# Patient Record
Sex: FEMALE | Race: BLACK OR AFRICAN AMERICAN | Employment: PART TIME | URBAN - METROPOLITAN AREA
[De-identification: names, ages, dates, MRNs, and addresses within clinical notes are randomized per-mention and may not be internally consistent; named-entity substitution may affect disease eponyms.]

---

## 2022-06-21 ENCOUNTER — APPOINTMENT (OUTPATIENT)
Dept: ULTRASOUND IMAGING | Age: 22
DRG: 872 | End: 2022-06-21

## 2022-06-21 ENCOUNTER — HOSPITAL ENCOUNTER (INPATIENT)
Age: 22
LOS: 3 days | Discharge: HOME | DRG: 872 | End: 2022-06-24
Attending: EMERGENCY MEDICINE

## 2022-06-21 DIAGNOSIS — N72 CERVICITIS AND ENDOCERVICITIS: ICD-10-CM

## 2022-06-21 DIAGNOSIS — N30.00 ACUTE CYSTITIS WITHOUT HEMATURIA: ICD-10-CM

## 2022-06-21 DIAGNOSIS — N73.0 PID (ACUTE PELVIC INFLAMMATORY DISEASE): ICD-10-CM

## 2022-06-21 DIAGNOSIS — N73.9 PID (PELVIC INFLAMMATORY DISEASE): ICD-10-CM

## 2022-06-21 DIAGNOSIS — A59.9 TRICHOMONIASIS: Primary | ICD-10-CM

## 2022-06-21 DIAGNOSIS — N70.91 SALPINGITIS: ICD-10-CM

## 2022-06-21 PROBLEM — N39.0 UTI (URINARY TRACT INFECTION): Status: ACTIVE | Noted: 2022-06-21

## 2022-06-21 LAB
ALBUMIN SERPL-MCNC: 3.7 G/DL (ref 3.5–5)
ALBUMIN/GLOB SERPL: 0.8 {RATIO} (ref 1.2–3.5)
ALP SERPL-CCNC: 85 U/L (ref 50–136)
ALT SERPL-CCNC: 16 U/L (ref 12–65)
ANION GAP SERPL CALC-SCNC: 9 MMOL/L (ref 7–16)
AST SERPL-CCNC: 9 U/L (ref 15–37)
BASOPHILS # BLD: 0.1 K/UL (ref 0–0.2)
BASOPHILS # BLD: 0.1 K/UL (ref 0–0.2)
BASOPHILS NFR BLD: 0 % (ref 0–2)
BASOPHILS NFR BLD: 0 % (ref 0–2)
BILIRUB SERPL-MCNC: 0.4 MG/DL (ref 0.2–1.1)
BILIRUB UR QL: ABNORMAL
BUN SERPL-MCNC: 8 MG/DL (ref 6–23)
CALCIUM SERPL-MCNC: 9.1 MG/DL (ref 8.3–10.4)
CHLORIDE SERPL-SCNC: 104 MMOL/L (ref 98–107)
CO2 SERPL-SCNC: 25 MMOL/L (ref 21–32)
CREAT SERPL-MCNC: 0.7 MG/DL (ref 0.6–1)
DIFFERENTIAL METHOD BLD: ABNORMAL
DIFFERENTIAL METHOD BLD: ABNORMAL
EOSINOPHIL # BLD: 0 K/UL (ref 0–0.8)
EOSINOPHIL # BLD: 0 K/UL (ref 0–0.8)
EOSINOPHIL NFR BLD: 0 % (ref 0.5–7.8)
EOSINOPHIL NFR BLD: 0 % (ref 0.5–7.8)
ERYTHROCYTE [DISTWIDTH] IN BLOOD BY AUTOMATED COUNT: 18.8 % (ref 11.9–14.6)
ERYTHROCYTE [DISTWIDTH] IN BLOOD BY AUTOMATED COUNT: 18.9 % (ref 11.9–14.6)
GLOBULIN SER CALC-MCNC: 4.8 G/DL (ref 2.3–3.5)
GLUCOSE SERPL-MCNC: 84 MG/DL (ref 65–100)
GLUCOSE UR QL STRIP.AUTO: NEGATIVE MG/DL
HCG UR QL: NEGATIVE
HCT VFR BLD AUTO: 34.7 % (ref 35.8–46.3)
HCT VFR BLD AUTO: 37.6 % (ref 35.8–46.3)
HGB BLD-MCNC: 10.6 G/DL (ref 11.7–15.4)
HGB BLD-MCNC: 11.2 G/DL (ref 11.7–15.4)
IMM GRANULOCYTES # BLD AUTO: 0.1 K/UL (ref 0–0.5)
IMM GRANULOCYTES # BLD AUTO: 0.2 K/UL (ref 0–0.5)
IMM GRANULOCYTES NFR BLD AUTO: 1 % (ref 0–5)
IMM GRANULOCYTES NFR BLD AUTO: 1 % (ref 0–5)
KETONES UR-MCNC: ABNORMAL MG/DL
LEUKOCYTE ESTERASE UR QL STRIP: ABNORMAL
LIPASE SERPL-CCNC: 28 U/L (ref 73–393)
LYMPHOCYTES # BLD: 1.7 K/UL (ref 0.5–4.6)
LYMPHOCYTES # BLD: 2.5 K/UL (ref 0.5–4.6)
LYMPHOCYTES NFR BLD: 11 % (ref 13–44)
LYMPHOCYTES NFR BLD: 7 % (ref 13–44)
MCH RBC QN AUTO: 21.1 PG (ref 26.1–32.9)
MCH RBC QN AUTO: 21.6 PG (ref 26.1–32.9)
MCHC RBC AUTO-ENTMCNC: 29.8 G/DL (ref 31.4–35)
MCHC RBC AUTO-ENTMCNC: 30.5 G/DL (ref 31.4–35)
MCV RBC AUTO: 70.7 FL (ref 79.6–97.8)
MCV RBC AUTO: 70.9 FL (ref 79.6–97.8)
MONOCYTES # BLD: 1.5 K/UL (ref 0.1–1.3)
MONOCYTES # BLD: 1.6 K/UL (ref 0.1–1.3)
MONOCYTES NFR BLD: 6 % (ref 4–12)
MONOCYTES NFR BLD: 7 % (ref 4–12)
NEUTS SEG # BLD: 17.9 K/UL (ref 1.7–8.2)
NEUTS SEG # BLD: 21.4 K/UL (ref 1.7–8.2)
NEUTS SEG NFR BLD: 81 % (ref 43–78)
NEUTS SEG NFR BLD: 86 % (ref 43–78)
NITRITE UR QL: NEGATIVE
NRBC # BLD: 0 K/UL (ref 0–0.2)
NRBC # BLD: 0 K/UL (ref 0–0.2)
PH UR: 6.5 [PH] (ref 5–9)
PLATELET # BLD AUTO: 312 K/UL (ref 150–450)
PLATELET # BLD AUTO: 340 K/UL (ref 150–450)
PMV BLD AUTO: 10.5 FL (ref 9.4–12.3)
PMV BLD AUTO: 9.9 FL (ref 9.4–12.3)
POTASSIUM SERPL-SCNC: 3.6 MMOL/L (ref 3.5–5.1)
PROT SERPL-MCNC: 8.5 G/DL (ref 6.3–8.2)
PROT UR QL: 30 MG/DL
RBC # BLD AUTO: 4.91 M/UL (ref 4.05–5.2)
RBC # BLD AUTO: 5.3 M/UL (ref 4.05–5.2)
RBC # UR STRIP: ABNORMAL /UL
SERVICE CMNT-IMP: ABNORMAL
SERVICE CMNT-IMP: NORMAL
SODIUM SERPL-SCNC: 138 MMOL/L (ref 136–145)
SP GR UR: 1.02 (ref 1–1.02)
UROBILINOGEN UR QL: 0.2 EU/DL (ref 0.2–1)
WBC # BLD AUTO: 22 K/UL (ref 4.3–11.1)
WBC # BLD AUTO: 24.9 K/UL (ref 4.3–11.1)
WET PREP GENITAL: NORMAL

## 2022-06-21 PROCEDURE — 2500000003 HC RX 250 WO HCPCS: Performed by: PHYSICIAN ASSISTANT

## 2022-06-21 PROCEDURE — 81025 URINE PREGNANCY TEST: CPT

## 2022-06-21 PROCEDURE — 83690 ASSAY OF LIPASE: CPT

## 2022-06-21 PROCEDURE — 87040 BLOOD CULTURE FOR BACTERIA: CPT

## 2022-06-21 PROCEDURE — 87491 CHLMYD TRACH DNA AMP PROBE: CPT

## 2022-06-21 PROCEDURE — 86592 SYPHILIS TEST NON-TREP QUAL: CPT

## 2022-06-21 PROCEDURE — 76857 US EXAM PELVIC LIMITED: CPT

## 2022-06-21 PROCEDURE — 87210 SMEAR WET MOUNT SALINE/INK: CPT

## 2022-06-21 PROCEDURE — 99285 EMERGENCY DEPT VISIT HI MDM: CPT

## 2022-06-21 PROCEDURE — 81001 URINALYSIS AUTO W/SCOPE: CPT

## 2022-06-21 PROCEDURE — 83735 ASSAY OF MAGNESIUM: CPT

## 2022-06-21 PROCEDURE — 96374 THER/PROPH/DIAG INJ IV PUSH: CPT

## 2022-06-21 PROCEDURE — 6370000000 HC RX 637 (ALT 250 FOR IP): Performed by: FAMILY MEDICINE

## 2022-06-21 PROCEDURE — 96372 THER/PROPH/DIAG INJ SC/IM: CPT

## 2022-06-21 PROCEDURE — 87086 URINE CULTURE/COLONY COUNT: CPT

## 2022-06-21 PROCEDURE — 81003 URINALYSIS AUTO W/O SCOPE: CPT

## 2022-06-21 PROCEDURE — 87389 HIV-1 AG W/HIV-1&-2 AB AG IA: CPT

## 2022-06-21 PROCEDURE — 6360000002 HC RX W HCPCS: Performed by: FAMILY MEDICINE

## 2022-06-21 PROCEDURE — 36415 COLL VENOUS BLD VENIPUNCTURE: CPT

## 2022-06-21 PROCEDURE — 2580000003 HC RX 258: Performed by: PHYSICIAN ASSISTANT

## 2022-06-21 PROCEDURE — 85025 COMPLETE CBC W/AUTO DIFF WBC: CPT

## 2022-06-21 PROCEDURE — 1100000000 HC RM PRIVATE

## 2022-06-21 PROCEDURE — 6360000002 HC RX W HCPCS: Performed by: PHYSICIAN ASSISTANT

## 2022-06-21 PROCEDURE — 80053 COMPREHEN METABOLIC PANEL: CPT

## 2022-06-21 RX ORDER — SODIUM CHLORIDE 0.9 % (FLUSH) 0.9 %
3 SYRINGE (ML) INJECTION EVERY 8 HOURS
Status: DISCONTINUED | OUTPATIENT
Start: 2022-06-21 | End: 2022-06-24 | Stop reason: HOSPADM

## 2022-06-21 RX ORDER — SODIUM CHLORIDE 0.9 % (FLUSH) 0.9 %
5-40 SYRINGE (ML) INJECTION EVERY 12 HOURS SCHEDULED
Status: DISCONTINUED | OUTPATIENT
Start: 2022-06-21 | End: 2022-06-24 | Stop reason: HOSPADM

## 2022-06-21 RX ORDER — METRONIDAZOLE 500 MG/1
500 TABLET ORAL EVERY 12 HOURS
Status: DISCONTINUED | OUTPATIENT
Start: 2022-06-22 | End: 2022-06-24 | Stop reason: HOSPADM

## 2022-06-21 RX ORDER — ACETAMINOPHEN 325 MG/1
650 TABLET ORAL EVERY 6 HOURS PRN
Status: DISCONTINUED | OUTPATIENT
Start: 2022-06-21 | End: 2022-06-24 | Stop reason: HOSPADM

## 2022-06-21 RX ORDER — KETOROLAC TROMETHAMINE 30 MG/ML
30 INJECTION, SOLUTION INTRAMUSCULAR; INTRAVENOUS ONCE
Status: COMPLETED | OUTPATIENT
Start: 2022-06-21 | End: 2022-06-21

## 2022-06-21 RX ORDER — CLINDAMYCIN PHOSPHATE 900 MG/50ML
900 INJECTION INTRAVENOUS
Status: DISCONTINUED | OUTPATIENT
Start: 2022-06-21 | End: 2022-06-21 | Stop reason: ALTCHOICE

## 2022-06-21 RX ORDER — METRONIDAZOLE 500 MG/1
500 TABLET ORAL EVERY 12 HOURS SCHEDULED
Status: DISCONTINUED | OUTPATIENT
Start: 2022-06-21 | End: 2022-06-21

## 2022-06-21 RX ORDER — SODIUM CHLORIDE 9 MG/ML
INJECTION, SOLUTION INTRAVENOUS PRN
Status: DISCONTINUED | OUTPATIENT
Start: 2022-06-21 | End: 2022-06-24 | Stop reason: HOSPADM

## 2022-06-21 RX ORDER — ONDANSETRON 2 MG/ML
4 INJECTION INTRAMUSCULAR; INTRAVENOUS EVERY 6 HOURS PRN
Status: DISCONTINUED | OUTPATIENT
Start: 2022-06-21 | End: 2022-06-24 | Stop reason: HOSPADM

## 2022-06-21 RX ORDER — DOXYCYCLINE HYCLATE 100 MG/1
100 CAPSULE ORAL EVERY 12 HOURS SCHEDULED
Status: DISCONTINUED | OUTPATIENT
Start: 2022-06-22 | End: 2022-06-21

## 2022-06-21 RX ORDER — 0.9 % SODIUM CHLORIDE 0.9 %
1000 INTRAVENOUS SOLUTION INTRAVENOUS
Status: COMPLETED | OUTPATIENT
Start: 2022-06-21 | End: 2022-06-21

## 2022-06-21 RX ORDER — METRONIDAZOLE 500 MG/100ML
500 INJECTION, SOLUTION INTRAVENOUS ONCE
Status: COMPLETED | OUTPATIENT
Start: 2022-06-21 | End: 2022-06-22

## 2022-06-21 RX ORDER — MORPHINE SULFATE 2 MG/ML
1 INJECTION, SOLUTION INTRAMUSCULAR; INTRAVENOUS EVERY 4 HOURS PRN
Status: DISCONTINUED | OUTPATIENT
Start: 2022-06-21 | End: 2022-06-24 | Stop reason: HOSPADM

## 2022-06-21 RX ORDER — ACETAMINOPHEN 650 MG/1
650 SUPPOSITORY RECTAL EVERY 6 HOURS PRN
Status: DISCONTINUED | OUTPATIENT
Start: 2022-06-21 | End: 2022-06-24 | Stop reason: HOSPADM

## 2022-06-21 RX ORDER — OXYCODONE HYDROCHLORIDE 5 MG/1
5 TABLET ORAL EVERY 4 HOURS PRN
Status: DISCONTINUED | OUTPATIENT
Start: 2022-06-21 | End: 2022-06-24 | Stop reason: HOSPADM

## 2022-06-21 RX ORDER — METRONIDAZOLE 500 MG/100ML
500 INJECTION, SOLUTION INTRAVENOUS
Status: DISCONTINUED | OUTPATIENT
Start: 2022-06-21 | End: 2022-06-21

## 2022-06-21 RX ORDER — ONDANSETRON 4 MG/1
4 TABLET, ORALLY DISINTEGRATING ORAL EVERY 8 HOURS PRN
Status: DISCONTINUED | OUTPATIENT
Start: 2022-06-21 | End: 2022-06-24 | Stop reason: HOSPADM

## 2022-06-21 RX ORDER — SODIUM CHLORIDE 0.9 % (FLUSH) 0.9 %
5-40 SYRINGE (ML) INJECTION PRN
Status: DISCONTINUED | OUTPATIENT
Start: 2022-06-21 | End: 2022-06-24 | Stop reason: HOSPADM

## 2022-06-21 RX ORDER — POLYETHYLENE GLYCOL 3350 17 G/17G
17 POWDER, FOR SOLUTION ORAL DAILY PRN
Status: DISCONTINUED | OUTPATIENT
Start: 2022-06-21 | End: 2022-06-24 | Stop reason: HOSPADM

## 2022-06-21 RX ADMIN — ACETAMINOPHEN 650 MG: 325 TABLET ORAL at 20:24

## 2022-06-21 RX ADMIN — LIDOCAINE HYDROCHLORIDE 1000 MG: 10 INJECTION, SOLUTION INFILTRATION; PERINEURAL at 12:29

## 2022-06-21 RX ADMIN — OXYCODONE 5 MG: 5 TABLET ORAL at 22:06

## 2022-06-21 RX ADMIN — SODIUM CHLORIDE, PRESERVATIVE FREE 3 ML: 5 INJECTION INTRAVENOUS at 20:28

## 2022-06-21 RX ADMIN — METRONIDAZOLE 500 MG: 500 INJECTION, SOLUTION INTRAVENOUS at 23:35

## 2022-06-21 RX ADMIN — CEFTRIAXONE 1000 MG: 1 INJECTION, POWDER, FOR SOLUTION INTRAMUSCULAR; INTRAVENOUS at 16:01

## 2022-06-21 RX ADMIN — SODIUM CHLORIDE 1000 ML: 9 INJECTION, SOLUTION INTRAVENOUS at 14:44

## 2022-06-21 RX ADMIN — KETOROLAC TROMETHAMINE 30 MG: 30 INJECTION, SOLUTION INTRAMUSCULAR at 14:45

## 2022-06-21 RX ADMIN — MORPHINE SULFATE 1 MG: 2 INJECTION, SOLUTION INTRAMUSCULAR; INTRAVENOUS at 18:53

## 2022-06-21 ASSESSMENT — PAIN SCALES - GENERAL
PAINLEVEL_OUTOF10: 9
PAINLEVEL_OUTOF10: 0
PAINLEVEL_OUTOF10: 2
PAINLEVEL_OUTOF10: 10
PAINLEVEL_OUTOF10: 0
PAINLEVEL_OUTOF10: 0
PAINLEVEL_OUTOF10: 10

## 2022-06-21 ASSESSMENT — ENCOUNTER SYMPTOMS
COUGH: 0
NAUSEA: 0
EYES NEGATIVE: 1
RESPIRATORY NEGATIVE: 1
ABDOMINAL PAIN: 0
DIARRHEA: 0
SORE THROAT: 0
BELCHING: 0
ALLERGIC/IMMUNOLOGIC NEGATIVE: 1
SHORTNESS OF BREATH: 0
HEMATEMESIS: 0
HEMATOCHEZIA: 0
VOMITING: 0
GASTROINTESTINAL NEGATIVE: 1
CONSTIPATION: 0
FLATUS: 0

## 2022-06-21 ASSESSMENT — PAIN DESCRIPTION - LOCATION
LOCATION: ABDOMEN

## 2022-06-21 ASSESSMENT — PAIN DESCRIPTION - ORIENTATION
ORIENTATION: MID;LOWER
ORIENTATION: MID;LOWER

## 2022-06-21 ASSESSMENT — PAIN DESCRIPTION - DESCRIPTORS
DESCRIPTORS: SHARP;ACHING;DISCOMFORT
DESCRIPTORS: ACHING

## 2022-06-21 ASSESSMENT — PAIN - FUNCTIONAL ASSESSMENT
PAIN_FUNCTIONAL_ASSESSMENT: ACTIVITIES ARE NOT PREVENTED
PAIN_FUNCTIONAL_ASSESSMENT: 0-10

## 2022-06-21 ASSESSMENT — PAIN DESCRIPTION - PAIN TYPE: TYPE: ACUTE PAIN

## 2022-06-21 ASSESSMENT — PAIN DESCRIPTION - FREQUENCY: FREQUENCY: CONTINUOUS

## 2022-06-21 ASSESSMENT — PAIN DESCRIPTION - ONSET: ONSET: ON-GOING

## 2022-06-21 NOTE — ED TRIAGE NOTES
Pt ambulatory, mask in place. Pt complaining of heavy bleeding and abdominal pain x 2 days. Pt states mother has hx of fibroids.

## 2022-06-21 NOTE — H&P
Hospitalist Admission History and Physical         NAME:            Анна Salazar    Age:                24 y.o.    :               2000    MRN:              [id number]    PCP: None Provider    Consulting MD:    Treatment Team: Attending Provider: Hansa Samuels DO; Physician Assistant: ORION Verde; Registered Nurse: Darylene Carpen, RN         Chief Complaint   Patient presents with    Abdominal Pain   HPI:    Patient is a 24 y.o. female who presented to the ED for cc heavy menstrual bleeding and abdominal pain for the past two days. States her last menstrual cycle was two weeks ago but now going through 3 pads a day. Nothing seems to make better or worse. No significant hx. Vitals- Stable    Labs- WBC 22    Wet prep with trichomonas noted. Clue cells absent. US abdomen -   BILATERAL FALLOPIAN TUBE DILATATION WITH COMPLEX FLUID SUSPICIOUS   FOR SALPINGITIS WITHOUT KASHMIR TUBO-OVARIAN ABSCESS IN THIS CLINICAL SETTING. No past medical history on file. No past surgical history on file. No family history on file. Family history reviewed and negative except as noted above.          Social History     Social History Narrative    Not on file            Social History     Tobacco Use    Smoking status: Not on file    Smokeless tobacco: Not on file   Substance Use Topics    Alcohol use: Not on file            Social History     Substance and Sexual Activity   Drug Use Not on file                 No Known Allergies         Prior to Admission medications    Not on File                      Review of Systems         Constitutional: in pain    Eyes:  no change in visual acuity, no photophobia    Ears, nose, mouth, throat, and face: no  Odynphagia, dysphagia, no thrush or exudate, negative for chronic sinus congestion, recurrent headaches    Respiratory: negative for SOB, hemoptysis or cough    Cardiovascular: negative for CP, palpitations, or PND    Gastrointestinal: lower abdominal pain    Genitourinary: vaginal bleeding. Pain in pelvis    Integument/breast: negative for skin rash or skin lesions    Hematologic/lymphatic: negative for known bleeding disorder    Musculoskeletal:negative for joint pain or joint tenderness    Neurological: negative for lightheadedness, syncope or presyncopal events, no seizure or CVA history    Behavioral/Psych: negative for depression or chronic anxiety,    Endocrine: negative for polydyspia, polyuria or intolerance to heat or cold    Allergic/Immunologic: negative for chronic allergic rhinitis, or known connective tissue disorder              Objective:         Patient Vitals for the past 24 hrs:   Temp Pulse Resp BP SpO2   06/21/22 1115 98.4 °F (36.9 °C) 81 16 107/73 98 %            No intake/output data recorded. No intake/output data recorded.          Data Review:   Recent Results (from the past 24 hour(s))   POCT Urinalysis no Micro    Collection Time: 06/21/22 11:33 AM   Result Value Ref Range    Specific Gravity, Urine, POC 1.020 1.001 - 1.023      pH, Urine, POC 6.5 5.0 - 9.0      Protein, Urine, POC 30 (A) NEG mg/dL    Glucose, UA POC Negative NEG mg/dL    KETONES, Urine, POC TRACE (A) NEG mg/dL    Bilirubin, Urine, POC SMALL (A) NEG      Blood, UA POC LARGE (A) NEG      URINE UROBILINOGEN POC 0.2 0.2 - 1.0 EU/dL    Nitrate, Urine, POC Negative NEG      Leukocyte Est, UA POC SMALL (A) NEG      Performed by: Julia Paz    POC Pregnancy Urine Qual    Collection Time: 06/21/22 11:35 AM   Result Value Ref Range    Preg Test, Ur Negative NEG     Urinalysis w rflx microscopic    Collection Time: 06/21/22 11:40 AM   Result Value Ref Range    Color, UA YELLOW/STRAW      Appearance TURBID      Specific Gravity, UA >1.035 (H) 1.001 - 1.023    pH, Urine 6.5 5.0 - 9.0      Protein, UA 30 (A) NEG mg/dL    Glucose, UA Negative mg/dL    Ketones, Urine TRACE (A) NEG mg/dL    Bilirubin Urine Negative NEG      Blood, Urine LARGE (A) NEG      Urobilinogen, Urine 1.0 0.2 - 1.0 EU/dL    Nitrite, Urine Negative NEG      Leukocyte Esterase, Urine MODERATE (A) NEG      WBC, UA  0 /hpf    RBC, UA 10-20 0 /hpf    Epithelial Cells UA 5-10 0 /hpf    BACTERIA, URINE 1+ (H) 0 /hpf    Trichomonas, Urine OCCASIONAL      OTHER OBSERVATIONS RESULTS VERIFIED MANUALLY     Wet prep, genital    Collection Time: 06/21/22 11:55 AM    Specimen: Vaginal   Result Value Ref Range    Special Requests NO SPECIAL REQUESTS      Wet Prep 10 TO 50  WBCS SEEN  PER HPF       Wet Prep MODERATE  MOTILE TRICHOMONAS NOTED        Wet Prep NO YEAST SEEN      Wet Prep CLUE CELLS ABSENT     CBC with Auto Differential    Collection Time: 06/21/22 12:45 PM   Result Value Ref Range    WBC 22.0 (H) 4.3 - 11.1 K/uL    RBC 5.30 (H) 4.05 - 5.2 M/uL    Hemoglobin 11.2 (L) 11.7 - 15.4 g/dL    Hematocrit 37.6 35.8 - 46.3 %    MCV 70.9 (L) 79.6 - 97.8 FL    MCH 21.1 (L) 26.1 - 32.9 PG    MCHC 29.8 (L) 31.4 - 35.0 g/dL    RDW 18.9 (H) 11.9 - 14.6 %    Platelets 204 943 - 617 K/uL    MPV 10.5 9.4 - 12.3 FL    nRBC 0.00 0.0 - 0.2 K/uL    Differential Type AUTOMATED      Seg Neutrophils 81 (H) 43 - 78 %    Lymphocytes 11 (L) 13 - 44 %    Monocytes 7 4.0 - 12.0 %    Eosinophils % 0 (L) 0.5 - 7.8 %    Basophils 0 0.0 - 2.0 %    Immature Granulocytes 1 0.0 - 5.0 %    Segs Absolute 17.9 (H) 1.7 - 8.2 K/UL    Absolute Lymph # 2.5 0.5 - 4.6 K/UL    Absolute Mono # 1.5 (H) 0.1 - 1.3 K/UL    Absolute Eos # 0.0 0.0 - 0.8 K/UL    Basophils Absolute 0.1 0.0 - 0.2 K/UL    Absolute Immature Granulocyte 0.1 0.0 - 0.5 K/UL   Lipase    Collection Time: 06/21/22 12:45 PM   Result Value Ref Range    Lipase 28 (L) 73 - 393 U/L   Comprehensive Metabolic Panel    Collection Time: 06/21/22 12:45 PM   Result Value Ref Range    Sodium 138 136 - 145 mmol/L    Potassium 3.6 3.5 - 5.1 mmol/L    Chloride 104 98 - 107 mmol/L    CO2 25 21 - 32 mmol/L    Anion Gap 9 7 - 16 mmol/L    Glucose 84 65 - 100 mg/dL BUN 8 6 - 23 MG/DL    CREATININE 0.70 0.6 - 1.0 MG/DL    GFR African American >60 >60 ml/min/1.73m2    GFR Non- >60 >60 ml/min/1.73m2    Calcium 9.1 8.3 - 10.4 MG/DL    Total Bilirubin 0.4 0.2 - 1.1 MG/DL    ALT 16 12 - 65 U/L    AST 9 (L) 15 - 37 U/L    Alk Phosphatase 85 50 - 136 U/L    Total Protein 8.5 (H) 6.3 - 8.2 g/dL    Albumin 3.7 3.5 - 5.0 g/dL    Globulin 4.8 (H) 2.3 - 3.5 g/dL    Albumin/Globulin Ratio 0.8 (L) 1.2 - 3.5              Physical Exam:         General:    Alert, cooperative, obvious pain    Eyes:    Conjunctivae/corneas clear. PERRL    Ears:    Normal     Nose:    Wearing mask    Mouth/Throat:    Wearing mask    Neck:     no JVD. Back:     deferred    Lungs:     Clear to auscultation bilaterally. Heart:    Regular rate and rhythm, S1, S2 normal    Abdomen:     Soft, +BS, mild tenderness to lower quadrants    Extremities:    Extremities normal, atraumatic, no cyanosis or edema. Skin:    Skin color, texture, turgor normal. No rashes or lesions    Neurologic:    CNII-XII intact. Normal strength, sensation and reflexes throughout. Assessment and Plan         Principal Problem:    PID (acute pelvic inflammatory disease)  Active Problems:    Salpingitis    UTI (urinary tract infection)  Resolved Problems:    * No resolved hospital problems. *    PID with salphingitis - Consult GYN. Start Ceftriaxone and Flagyl. UDS, HIV, RPR ordered. Urine HCG    UTI- abx as above. Urine culture ordered.      Intractable pain from PID - PRN pain medications    DVT prophylaxis - SCDs       Signed By:    Raj Torres DO    June 21, 2022

## 2022-06-21 NOTE — PROGRESS NOTES
TRANSFER - IN REPORT:    Verbal report received from [place] on [address]  being received from ED for routine progression of patient care      Report consisted of patient's Situation, Background, Assessment and   Recommendations(SBAR). Information from the following report(s) Nurse Handoff Report, Index, ED SBAR, Adult Overview, Intake/Output and MAR was reviewed with the receiving nurse. Opportunity for questions and clarification was provided. Assessment completed upon patient's arrival to unit and care assumed.

## 2022-06-21 NOTE — ED NOTES
Educated patient I called her mother, she states she wants to go to her care to get her phone , and educated the patient she is not able to leave the hospital with an IV in her arm, and she was about to be transferred to a room. Educated the patient there would be a phone in her room.       Adwoa Vazquez RN  06/21/22 7818

## 2022-06-22 PROBLEM — N92.0 MENORRHAGIA: Status: ACTIVE | Noted: 2022-06-22

## 2022-06-22 PROBLEM — D62 ACUTE BLOOD LOSS ANEMIA: Status: ACTIVE | Noted: 2022-06-22

## 2022-06-22 PROBLEM — A41.9 SEPSIS (HCC): Status: ACTIVE | Noted: 2022-06-22

## 2022-06-22 LAB
ANION GAP SERPL CALC-SCNC: 7 MMOL/L (ref 7–16)
APPEARANCE UR: ABNORMAL
BACTERIA URNS QL MICRO: ABNORMAL /HPF
BILIRUB UR QL: NEGATIVE
BUN SERPL-MCNC: 8 MG/DL (ref 6–23)
CALCIUM SERPL-MCNC: 9.1 MG/DL (ref 8.3–10.4)
CHLORIDE SERPL-SCNC: 104 MMOL/L (ref 98–107)
CO2 SERPL-SCNC: 26 MMOL/L (ref 21–32)
COLOR UR: ABNORMAL
CREAT SERPL-MCNC: 0.8 MG/DL (ref 0.6–1)
EPI CELLS #/AREA URNS HPF: ABNORMAL /HPF
GLUCOSE SERPL-MCNC: 109 MG/DL (ref 65–100)
GLUCOSE UR STRIP.AUTO-MCNC: NEGATIVE MG/DL
HGB UR QL STRIP: ABNORMAL
HIV 1+2 AB+HIV1 P24 AG SERPL QL IA: NONREACTIVE
HIV 1/2 RESULT COMMENT: NORMAL
KETONES UR QL STRIP.AUTO: ABNORMAL MG/DL
LEUKOCYTE ESTERASE UR QL STRIP.AUTO: ABNORMAL
MAGNESIUM SERPL-MCNC: 1.9 MG/DL (ref 1.8–2.4)
NITRITE UR QL STRIP.AUTO: NEGATIVE
OTHER OBSERVATIONS: ABNORMAL
PH UR STRIP: 6.5 [PH] (ref 5–9)
POTASSIUM SERPL-SCNC: 3.4 MMOL/L (ref 3.5–5.1)
PROT UR STRIP-MCNC: 30 MG/DL
RBC #/AREA URNS HPF: ABNORMAL /HPF
SODIUM SERPL-SCNC: 137 MMOL/L (ref 136–145)
SP GR UR REFRACTOMETRY: >1.035 (ref 1–1.02)
TRICHOMONAS UR QL MICRO: ABNORMAL
UROBILINOGEN UR QL STRIP.AUTO: 1 EU/DL (ref 0.2–1)
WBC URNS QL MICRO: >100 /HPF

## 2022-06-22 PROCEDURE — 99253 IP/OBS CNSLTJ NEW/EST LOW 45: CPT | Performed by: OBSTETRICS & GYNECOLOGY

## 2022-06-22 PROCEDURE — 2580000003 HC RX 258: Performed by: PHYSICIAN ASSISTANT

## 2022-06-22 PROCEDURE — 1100000000 HC RM PRIVATE

## 2022-06-22 PROCEDURE — 2580000003 HC RX 258: Performed by: FAMILY MEDICINE

## 2022-06-22 PROCEDURE — 51798 US URINE CAPACITY MEASURE: CPT

## 2022-06-22 PROCEDURE — 2500000003 HC RX 250 WO HCPCS: Performed by: OBSTETRICS & GYNECOLOGY

## 2022-06-22 PROCEDURE — 6370000000 HC RX 637 (ALT 250 FOR IP): Performed by: FAMILY MEDICINE

## 2022-06-22 PROCEDURE — 2580000003 HC RX 258: Performed by: OBSTETRICS & GYNECOLOGY

## 2022-06-22 PROCEDURE — 6360000002 HC RX W HCPCS: Performed by: FAMILY MEDICINE

## 2022-06-22 RX ADMIN — DOXYCYCLINE 100 MG: 100 INJECTION, POWDER, LYOPHILIZED, FOR SOLUTION INTRAVENOUS at 11:30

## 2022-06-22 RX ADMIN — MORPHINE SULFATE 1 MG: 2 INJECTION, SOLUTION INTRAMUSCULAR; INTRAVENOUS at 15:17

## 2022-06-22 RX ADMIN — CEFTRIAXONE 1000 MG: 1 INJECTION, POWDER, FOR SOLUTION INTRAMUSCULAR; INTRAVENOUS at 11:43

## 2022-06-22 RX ADMIN — OXYCODONE 5 MG: 5 TABLET ORAL at 05:37

## 2022-06-22 RX ADMIN — DOXYCYCLINE 100 MG: 100 INJECTION, POWDER, LYOPHILIZED, FOR SOLUTION INTRAVENOUS at 23:40

## 2022-06-22 RX ADMIN — MORPHINE SULFATE 1 MG: 2 INJECTION, SOLUTION INTRAMUSCULAR; INTRAVENOUS at 03:31

## 2022-06-22 RX ADMIN — SODIUM CHLORIDE, PRESERVATIVE FREE 10 ML: 5 INJECTION INTRAVENOUS at 11:31

## 2022-06-22 RX ADMIN — METRONIDAZOLE 500 MG: 500 TABLET ORAL at 15:17

## 2022-06-22 RX ADMIN — SODIUM CHLORIDE, PRESERVATIVE FREE 3 ML: 5 INJECTION INTRAVENOUS at 11:36

## 2022-06-22 RX ADMIN — SODIUM CHLORIDE, PRESERVATIVE FREE 5 ML: 5 INJECTION INTRAVENOUS at 20:33

## 2022-06-22 RX ADMIN — MORPHINE SULFATE 1 MG: 2 INJECTION, SOLUTION INTRAMUSCULAR; INTRAVENOUS at 08:37

## 2022-06-22 RX ADMIN — OXYCODONE 5 MG: 5 TABLET ORAL at 11:15

## 2022-06-22 RX ADMIN — MORPHINE SULFATE 1 MG: 2 INJECTION, SOLUTION INTRAMUSCULAR; INTRAVENOUS at 21:00

## 2022-06-22 RX ADMIN — OXYCODONE 5 MG: 5 TABLET ORAL at 17:43

## 2022-06-22 RX ADMIN — METRONIDAZOLE 500 MG: 500 TABLET ORAL at 03:31

## 2022-06-22 RX ADMIN — ACETAMINOPHEN 650 MG: 325 TABLET ORAL at 11:40

## 2022-06-22 RX ADMIN — SODIUM CHLORIDE, PRESERVATIVE FREE 3 ML: 5 INJECTION INTRAVENOUS at 03:31

## 2022-06-22 ASSESSMENT — PAIN DESCRIPTION - LOCATION
LOCATION: ABDOMEN

## 2022-06-22 ASSESSMENT — PAIN DESCRIPTION - PAIN TYPE
TYPE: ACUTE PAIN

## 2022-06-22 ASSESSMENT — PAIN - FUNCTIONAL ASSESSMENT
PAIN_FUNCTIONAL_ASSESSMENT: ACTIVITIES ARE NOT PREVENTED

## 2022-06-22 ASSESSMENT — PAIN DESCRIPTION - DESCRIPTORS
DESCRIPTORS: PRESSURE;CRAMPING
DESCRIPTORS: CRAMPING;PRESSURE
DESCRIPTORS: ACHING;DISCOMFORT;SHARP
DESCRIPTORS: SHARP;PRESSURE;SHOOTING
DESCRIPTORS: ACHING;DISCOMFORT
DESCRIPTORS: PRESSURE;CRAMPING

## 2022-06-22 ASSESSMENT — PAIN SCALES - GENERAL
PAINLEVEL_OUTOF10: 8
PAINLEVEL_OUTOF10: 10
PAINLEVEL_OUTOF10: 5
PAINLEVEL_OUTOF10: 0
PAINLEVEL_OUTOF10: 9
PAINLEVEL_OUTOF10: 0
PAINLEVEL_OUTOF10: 9
PAINLEVEL_OUTOF10: 0
PAINLEVEL_OUTOF10: 7
PAINLEVEL_OUTOF10: 0

## 2022-06-22 ASSESSMENT — PAIN DESCRIPTION - ONSET
ONSET: ON-GOING

## 2022-06-22 ASSESSMENT — PAIN DESCRIPTION - ORIENTATION
ORIENTATION: LOWER;MID
ORIENTATION: MID;LOWER
ORIENTATION: LOWER
ORIENTATION: LOWER
ORIENTATION: MID;LOWER
ORIENTATION: LOWER

## 2022-06-22 ASSESSMENT — PAIN DESCRIPTION - FREQUENCY
FREQUENCY: CONTINUOUS

## 2022-06-22 NOTE — CONSULTS
GYN CONSULT  Called to see patient for consultation regarding Dx of PID. She is a 25 yo BF G0 with Hx of STDs. Presented to ED with c/o severe lower abdominal pain and vaginal bleeding before expected menses. reviewed records to this point. U/S confirms bilateral salpingitis w/o TOA, so does not need surgical drainage. She is on rocephin and flagyl already. Will add doxycycline per CDC guidelines. Cultures pending. Bleeding most likely endometritis. She refused any type of abdominal exam d/t tenderness. We had a talk regarding realistic expectations with Tx of her salpingitis. She will have some pain until the swelling subsides. Also will follow her WBC down to normal, last lab was 25K with a left shift. Discussed long term implications of untreated or inadequately treated PID concerning infertility and chronic pain, pethaps surgical intervention. Opal Mc She will need to be here a few days. Discharge home on po antiBx and follow up in clinic. Waiting on cultures for now.

## 2022-06-22 NOTE — PROGRESS NOTES
Chart reviewed by [facility] and discussed in IDR. GYN consult. Cultures pending. WBC 06/21 24.9; labs pending. Patient with oral temp 100.5; on IV abx. Patient will need PCP at discharge. Patient denies ins benefits, [facility] notified in regards. CM following. ASSESSMENT NOTE    Attending Physician: Ander Rogers MD  Admit Problem: Cervicitis and endocervicitis [N72]  PID (pelvic inflammatory disease) [N73.9]  Trichomoniasis [A59.9]  PID (acute pelvic inflammatory disease) [N73.0]  Acute cystitis without hematuria [N30.00]  Date/Time of Admission: 6/21/2022 11:18 AM  Problem List:  Patient Active Problem List   Diagnosis    PID (acute pelvic inflammatory disease)    Salpingitis    UTI (urinary tract infection)    Acute blood loss anemia    Menorrhagia    Sepsis ([facility] [address]    Trichomoniasis       Service Assessment  Patient Orientation Alert and Oriented   Cognition Alert   History Provided By Patient   Primary Caregiver Self   Accompanied By/Relationship     Support Systems Parent (patient's parent:  Pantera Du [phone])   [facility] is: Legal Next of [address] 69   PCP Verified by CM     Last Visit to PCP     Prior Functional Level Independent in ADLs/IADLs   Current Functional Level     Can patient return to prior living arrangement Yes   Ability to make needs known: Good   Family able to assist with home care needs:     Would you like for me to discuss the discharge plan with any other family members/significant others, and if so, who?      Financial Resources     Community Resources     CM/SW Referral No PCP     Social/Functional History  Lives With Parent   Type of Home Apartment   Home Layout One level   Home Access Level entry   Entrance Stairs - Number of Steps     Entrance Stairs - Rails     Bathroom Shower/Tub Tub/Shower unit   Bathroom Toilet     Bathroom Equipment     Bathroom Accessibility     Home Equipment     Receives Help From Family   ADL Assistance Independent   [place] Dressing     Grooming     Feeding     Toileting     Homemaking Assistance Independent   Meal Prep     Laundry     Vacuuming     Cleaning     Gardening     Yard Work     Driving     Shopping          Other (Comment)     [address] [place] Paying/Finance Responsibility     [place] [address] Management     Other (Comment)     Ambuation Assistance Independent   Transfer Assisstance Independent   Active  Yes   Patient's  Info     Mode of Transportation Car   Education     Occupation Part time employment   Type of Occupation [employer]     Discharge Planning   Type of [address] Parent (patient's parent:  Griselda Dlil [phone])   Current Services Prior To Admission None   Potential Assistance Needed N/A   DME     DME     DME Ordered? No   Potential Assistance Purchasing Medications No   Meds-to-Beds: Does the patient want to have any new prescriptions delivered to bedside prior to discharge? Type of Home Care Services None   Patient expects to be discharged to: Follow Up Appointment: Best Day/Time     One/Two Story Residence: One story   # of Interior Steps     Height of Each Step (in)     [employer] Available     History of Falls? Services At/After Discharge  Transition of Care Consult (CM Consult): Discharge Planning   Internal Home Health     Internal Hospice     Reason Outside Agency [address]     Partner SNF     Reason Why Partner SNF Not Chosen     Internal Comfort Care     Reason Outside [address] Discharge None   [place] Resource Information Provided?  No   Mode of Transport at Discharge [address] Time of Discharge     Confirm Follow Up Transport Self     Condition of Participation: Discharge Planning  The plan for Transition of Care is related to the following treatment goals: return to baseline   The Patient and/or Patient Representative was provided with a Choice of Provider? Patient   Name of the Patient Representative who was provided with the Choice of Provider and agrees with the Discharge Plan? The Patient and/or Patient Representative Agree with the Discharge Plan? Yes   Freedom of Choice list was provided with basic dialogue that supports the individualized plan of care/goals, treatment preferences, and shares the quality data associated with the providers?  Yes     Documentation for Discharge Appeal  Discharge Appealed by     Date notified by QIO of appeal request:     Time notified by QIO of appeal request:     Detailed Notice of Discharge given to:     Date Notice of Discharge given:     Time Notice of Discharge given:     [facility]     [email or web address]     Date Notified of Outcome     Time Notified of Outcome     Outcome of appeal           Vahe Corral RN 06/22/22 12:14 PM

## 2022-06-22 NOTE — PROGRESS NOTES
Not voiding. Bladder scan showed 0 ml of urine. Pt states she urinated in the toilet around 11pm last night. No hat was in the bathroom. Hat placed in bathroom and pt educated on importance of collecting urine into the hat.

## 2022-06-22 NOTE — PROGRESS NOTES
END OF SHIFT NOTE:    INTAKE/OUTPUT  06/21 0701 - 06/22 0700  In: 604.3 [P.O.:505; I.V.:99.3]  Out: 200 [Urine:200]  Voiding: Yes  Catheter: No  Drain:              Flatus: Patient does have flatus present. Stool: 0 occurrences. Characteristics:           Stool Assessment  Last BM (including prior to admit): 06/20/22    Emesis: 0 occurrences. Characteristics:        VITAL SIGNS  Patient Vitals for the past 12 hrs:   Temp Pulse Resp BP SpO2   06/22/22 0401   18     06/22/22 0345 99.5 °F (37.5 °C) 98 18 (!) 118/92 100 %   06/21/22 2334 98.2 °F (36.8 °C) 100 18 117/69 99 %   06/21/22 2236   18     06/21/22 2130 98.7 °F (37.1 °C)       06/21/22 2013 (!) 102.9 °F (39.4 °C) (!) 114 18 109/66 99 %   06/21/22 1923   18         Pain Assessment  @BSHSIFLOW(13)@  Pain Location: Abdomen  Patient's Stated Pain Goal: 2    Ambulating  Yes to [facility]     Shift report given to oncoming nurse at the bedside.     Gloria Kaiser RN

## 2022-06-22 NOTE — PROGRESS NOTES
Blood cultures are drawn. Flagyl hanging in room not infusing. Messaged hospitalist. Anitadie Drop to administer now.

## 2022-06-22 NOTE — PLAN OF CARE
Problem: Discharge Planning  Goal: Discharge to home or other facility with appropriate resources  6/22/2022 0359 by Jonathan Guerrero RN  Outcome: Progressing  6/22/2022 0359 by Jonathan Guerrero RN  Outcome: Progressing     Problem: Pain  Goal: Verbalizes/displays adequate comfort level or baseline comfort level  6/22/2022 0359 by Jonathan Guerrero RN  Outcome: Progressing  6/22/2022 0359 by Jonathan Guerrero RN  Outcome: Progressing

## 2022-06-22 NOTE — PROGRESS NOTES
Hospitalist Progress Note   Admit Date:  2022 11:18 AM   Name:  Andre Juarez   Age:  24 y.o. Sex:  female  :  2000   MRN:  [id number]   Room:  218/    Presenting Complaint: Abdominal Pain     Reason(s) for Admission: Cervicitis and endocervicitis [N72]  PID (pelvic inflammatory disease) [N73.9]  Trichomoniasis [A59.9]  PID (acute pelvic inflammatory disease) [N73.0]  Acute cystitis without hematuria [N30.00]     Hospital Course & Interval History:   Please refer to the admission H&P for details of presentation. In summary, Andre Juarez is a 24 y.o. female with hx of STDs who was admitted due to abdominal pain with heavy menstrual bleeding before expected menses. Work-up shows WBC of 22, wet prep with trichomonas. Ultrasound abdomen with bilateral fallopian tube dilatation with complex fluid suspicious for salpingitis without tubo-ovarian abscess. Was started on IV antibiotics        Subjective/24 hr Events (22) : Patient is seen and examined at bedside. No acute events reported overnight by nursing staff. Continues to complain of lower abdominal pain. Has a febrile episode of 102.9 °F last night and 100.5 °F this morning. Patient denies chest pains, shortness of breath, n/v, abdominal pain. Review of Systems: 10 point review of systems is otherwise negative with the exception of the elements mentioned above. Assessment & Plan:   Sepsis // PID and Salpingitis // UTI  Imaging reviewed. Meets sepsis criteria with leukocytosis and fever. UA with 1+ bacteria wet prep with trichomonas. HIV neg  -Gynecology's recommendation  -Follow-up blood cultures, RPR, chlamydia and gonorrhea  -continue with Ceftriaxone, Flagyl, doxycycline    Obesity: Body mass index is 38.27 kg/m². Diet:  ADULT DIET;  Regular  DVT PPx:SCDs  Code status: Full Code    Hospital Problems:  Principal Problem:    PID (acute pelvic inflammatory disease)  Active Problems:    Salpingitis    UTI (urinary tract infection)    Acute blood loss anemia    Menorrhagia    Sepsis ([facility] [address]    Trichomoniasis  Resolved Problems:    * No resolved hospital problems. *      Objective:     Patient Vitals for the past 24 hrs:   Temp Pulse Resp BP SpO2   06/22/22 1136 (!) 100.5 °F (38.1 °C) 94 18 120/80 99 %   06/22/22 0723 98.7 °F (37.1 °C) 93 18 136/70 100 %   06/22/22 0607   18     06/22/22 0401   18     06/22/22 0345 99.5 °F (37.5 °C) 98 18 (!) 118/92 100 %   06/21/22 2334 98.2 °F (36.8 °C) 100 18 117/69 99 %   06/21/22 2236   18     06/21/22 2130 98.7 °F (37.1 °C)       06/21/22 2013 (!) 102.9 °F (39.4 °C) (!) 114 18 109/66 99 %   06/21/22 1923   18     06/21/22 1757 98.4 °F (36.9 °C)       06/21/22 1636 99.3 °F (37.4 °C) 88 19 126/74 97 %   06/21/22 1547    131/78 96 %   06/21/22 1532    127/72 97 %   06/21/22 1512    139/84 98 %       Oxygen Therapy  SpO2: 99 %  O2 Device: None (Room air)    Estimated body mass index is 38.27 kg/m² as calculated from the following:    Height as of this encounter: 5' 5\" (1.651 m). Weight as of this encounter: 230 lb (104.3 kg). Intake/Output Summary (Last 24 hours) at 6/22/2022 1511  Last data filed at 6/22/2022 0940  Gross per 24 hour   Intake 724.3 ml   Output 200 ml   Net 524.3 ml         Physical Exam:     Blood pressure 120/80, pulse 94, temperature (!) 100.5 °F (38.1 °C), temperature source Oral, resp. rate 18, height 5' 5\" (1.651 m), weight 230 lb (104.3 kg), SpO2 99 %. General:    Well nourished. Ill appearing. Head:  Normocephalic, atraumatic  Eyes:  Sclerae appear normal.  Pupils equally round. ENT:  Nares appear normal, no drainage. Moist oral mucosa  Neck:  No restricted ROM. Trachea midline   CV:   RRR. No m/r/g. No jugular venous distension. Lungs:   CTAB. No wheezing. Respirations even, unlabored  Abdomen: Bowel sounds present. Soft, +tender diffusely, nondistended. Extremities: No cyanosis or clubbing.   No edema  Skin:     No rashes and normal coloration. Warm and dry. Neuro:  CN II-XII grossly intact. A&Ox3  Psych:  Normal mood and affect. I have reviewed ordered lab tests and independently visualized imaging below:    Recent Labs:  Recent Results (from the past 48 hour(s))   POCT Urinalysis no Micro    Collection Time: 06/21/22 11:33 AM   Result Value Ref Range    Specific Gravity, Urine, POC 1.020 1.001 - 1.023      pH, Urine, POC 6.5 5.0 - 9.0      Protein, Urine, POC 30 (A) NEG mg/dL    Glucose, UA POC Negative NEG mg/dL    KETONES, Urine, POC TRACE (A) NEG mg/dL    Bilirubin, Urine, POC SMALL (A) NEG      Blood, UA POC LARGE (A) NEG      URINE UROBILINOGEN POC 0.2 0.2 - 1.0 EU/dL    Nitrate, Urine, POC Negative NEG      Leukocyte Est, UA POC SMALL (A) NEG      Performed by: Rufina Dyer    POC Pregnancy Urine Qual    Collection Time: 06/21/22 11:35 AM   Result Value Ref Range    Preg Test, Ur Negative NEG     Urinalysis w rflx microscopic    Collection Time: 06/21/22 11:40 AM   Result Value Ref Range    Color, UA YELLOW/STRAW      Appearance TURBID      Specific Gravity, UA >1.035 (H) 1.001 - 1.023    pH, Urine 6.5 5.0 - 9.0      Protein, UA 30 (A) NEG mg/dL    Glucose, UA Negative mg/dL    Ketones, Urine TRACE (A) NEG mg/dL    Bilirubin Urine Negative NEG      Blood, Urine LARGE (A) NEG      Urobilinogen, Urine 1.0 0.2 - 1.0 EU/dL    Nitrite, Urine Negative NEG      Leukocyte Esterase, Urine MODERATE (A) NEG      WBC, UA >100 0 /hpf    RBC, UA 20-50 0 /hpf    Epithelial Cells UA 5-10 0 /hpf    BACTERIA, URINE 1+ (H) 0 /hpf    Trichomonas, Urine OCCASIONAL      OTHER OBSERVATIONS RESULTS VERIFIED MANUALLY     Culture, Urine    Collection Time: 06/21/22 11:40 AM    Specimen: URINE-CLEAN CATCH   Result Value Ref Range    Special Requests NO SPECIAL REQUESTS      Culture        No growth after short period of incubation. Further results to follow after overnight incubation.    Wet prep, genital Collection Time: 06/21/22 11:55 AM    Specimen: Vaginal   Result Value Ref Range    Special Requests NO SPECIAL REQUESTS      Wet Prep 10 TO 50  WBCS SEEN  PER HPF       Wet Prep MODERATE  MOTILE TRICHOMONAS NOTED        Wet Prep NO YEAST SEEN      Wet Prep CLUE CELLS ABSENT     CBC with Auto Differential    Collection Time: 06/21/22 12:45 PM   Result Value Ref Range    WBC 22.0 (H) 4.3 - 11.1 K/uL    RBC 5.30 (H) 4.05 - 5.2 M/uL    Hemoglobin 11.2 (L) 11.7 - 15.4 g/dL    Hematocrit 37.6 35.8 - 46.3 %    MCV 70.9 (L) 79.6 - 97.8 FL    MCH 21.1 (L) 26.1 - 32.9 PG    MCHC 29.8 (L) 31.4 - 35.0 g/dL    RDW 18.9 (H) 11.9 - 14.6 %    Platelets 828 020 - 092 K/uL    MPV 10.5 9.4 - 12.3 FL    nRBC 0.00 0.0 - 0.2 K/uL    Differential Type AUTOMATED      Seg Neutrophils 81 (H) 43 - 78 %    Lymphocytes 11 (L) 13 - 44 %    Monocytes 7 4.0 - 12.0 %    Eosinophils % 0 (L) 0.5 - 7.8 %    Basophils 0 0.0 - 2.0 %    Immature Granulocytes 1 0.0 - 5.0 %    Segs Absolute 17.9 (H) 1.7 - 8.2 K/UL    Absolute Lymph # 2.5 0.5 - 4.6 K/UL    Absolute Mono # 1.5 (H) 0.1 - 1.3 K/UL    Absolute Eos # 0.0 0.0 - 0.8 K/UL    Basophils Absolute 0.1 0.0 - 0.2 K/UL    Absolute Immature Granulocyte 0.1 0.0 - 0.5 K/UL   Lipase    Collection Time: 06/21/22 12:45 PM   Result Value Ref Range    Lipase 28 (L) 73 - 393 U/L   Comprehensive Metabolic Panel    Collection Time: 06/21/22 12:45 PM   Result Value Ref Range    Sodium 138 136 - 145 mmol/L    Potassium 3.6 3.5 - 5.1 mmol/L    Chloride 104 98 - 107 mmol/L    CO2 25 21 - 32 mmol/L    Anion Gap 9 7 - 16 mmol/L    Glucose 84 65 - 100 mg/dL    BUN 8 6 - 23 MG/DL    CREATININE 0.70 0.6 - 1.0 MG/DL    GFR African American >60 >60 ml/min/1.73m2    GFR Non- >60 >60 ml/min/1.73m2    Calcium 9.1 8.3 - 10.4 MG/DL    Total Bilirubin 0.4 0.2 - 1.1 MG/DL    ALT 16 12 - 65 U/L    AST 9 (L) 15 - 37 U/L    Alk Phosphatase 85 50 - 136 U/L    Total Protein 8.5 (H) 6.3 - 8.2 g/dL    Albumin 3.7 3.5 - 5.0 g/dL    Globulin 4.8 (H) 2.3 - 3.5 g/dL    Albumin/Globulin Ratio 0.8 (L) 1.2 - 3.5     Culture, Blood 1    Collection Time: 06/21/22  2:52 PM    Specimen: Blood   Result Value Ref Range    Special Requests RIGHT  Antecubital        Culture NO GROWTH AFTER 16 HOURS     Culture, Blood 1    Collection Time: 06/21/22 11:04 PM    Specimen: Blood   Result Value Ref Range    Special Requests RIGHT  ARM        Culture NO GROWTH AFTER 7 HOURS     HIV 1/2 Ag/Ab, 4TH Generation,W Rflx Confirm    Collection Time: 06/21/22 11:04 PM   Result Value Ref Range    HIV 1/2 Interp NONREACTIVE NR      HIV 1/2 Result Comment SEE NOTE     Basic Metabolic Panel w/ Reflex to MG    Collection Time: 06/21/22 11:05 PM   Result Value Ref Range    Sodium 137 136 - 145 mmol/L    Potassium 3.4 (L) 3.5 - 5.1 mmol/L    Chloride 104 98 - 107 mmol/L    CO2 26 21 - 32 mmol/L    Anion Gap 7 7 - 16 mmol/L    Glucose 109 (H) 65 - 100 mg/dL    BUN 8 6 - 23 MG/DL    CREATININE 0.80 0.6 - 1.0 MG/DL    GFR African American >60 >60 ml/min/1.73m2    GFR Non- >60 >60 ml/min/1.73m2    Calcium 9.1 8.3 - 10.4 MG/DL   CBC with Auto Differential    Collection Time: 06/21/22 11:05 PM   Result Value Ref Range    WBC 24.9 (H) 4.3 - 11.1 K/uL    RBC 4.91 4.05 - 5.2 M/uL    Hemoglobin 10.6 (L) 11.7 - 15.4 g/dL    Hematocrit 34.7 (L) 35.8 - 46.3 %    MCV 70.7 (L) 79.6 - 97.8 FL    MCH 21.6 (L) 26.1 - 32.9 PG    MCHC 30.5 (L) 31.4 - 35.0 g/dL    RDW 18.8 (H) 11.9 - 14.6 %    Platelets 342 665 - 125 K/uL    MPV 9.9 9.4 - 12.3 FL    nRBC 0.00 0.0 - 0.2 K/uL    Differential Type AUTOMATED      Seg Neutrophils 86 (H) 43 - 78 %    Lymphocytes 7 (L) 13 - 44 %    Monocytes 6 4.0 - 12.0 %    Eosinophils % 0 (L) 0.5 - 7.8 %    Basophils 0 0.0 - 2.0 %    Immature Granulocytes 1 0.0 - 5.0 %    Segs Absolute 21.4 (H) 1.7 - 8.2 K/UL    Absolute Lymph # 1.7 0.5 - 4.6 K/UL    Absolute Mono # 1.6 (H) 0.1 - 1.3 K/UL    Absolute Eos # 0.0 0.0 - 0.8 K/UL    Basophils Absolute 0.1 0.0 - 0.2 K/UL    Absolute Immature Granulocyte 0.2 0.0 - 0.5 K/UL   Magnesium    Collection Time: 06/21/22 11:05 PM   Result Value Ref Range    Magnesium 1.9 1.8 - 2.4 mg/dL       Other Studies:  US PELVIS LIMITED   Final Result   BILATERAL FALLOPIAN TUBE DILATATION WITH COMPLEX FLUID SUSPICIOUS   FOR SALPINGITIS WITHOUT KASHMIR TUBO-OVARIAN ABSCESS IN THIS CLINICAL SETTING. Current Meds:  Current Facility-Administered Medications   Medication Dose Route Frequency    doxycycline (VIBRAMYCIN) 100 mg in sodium chloride 0.9 % 100 mL IVPB  100 mg IntraVENous Q12H    sodium chloride flush 0.9 % injection 3 mL  3 mL IntraVENous Q8H    sodium chloride flush 0.9 % injection 5-40 mL  5-40 mL IntraVENous 2 times per day    sodium chloride flush 0.9 % injection 5-40 mL  5-40 mL IntraVENous PRN    0.9 % sodium chloride infusion   IntraVENous PRN    ondansetron (ZOFRAN-ODT) disintegrating tablet 4 mg  4 mg Oral Q8H PRN    Or    ondansetron (ZOFRAN) injection 4 mg  4 mg IntraVENous Q6H PRN    polyethylene glycol (GLYCOLAX) packet 17 g  17 g Oral Daily PRN    acetaminophen (TYLENOL) tablet 650 mg  650 mg Oral Q6H PRN    Or    acetaminophen (TYLENOL) suppository 650 mg  650 mg Rectal Q6H PRN    oxyCODONE (ROXICODONE) immediate release tablet 5 mg  5 mg Oral Q4H PRN    morphine injection 1 mg  1 mg IntraVENous Q4H PRN    cefTRIAXone (ROCEPHIN) 1000 mg IVPB in NS 50ml minibag  1,000 mg IntraVENous Q24H    metroNIDAZOLE (FLAGYL) tablet 500 mg  500 mg Oral Q12H       Signed:  Bob Valladares MD    Part of this note may have been written by using a voice dictation software. The note has been proof read but may still contain some grammatical/other typographical errors.

## 2022-06-23 PROBLEM — A54.00: Status: ACTIVE | Noted: 2022-06-23

## 2022-06-23 LAB
AMPHET UR QL SCN: NEGATIVE
ANION GAP SERPL CALC-SCNC: 7 MMOL/L (ref 7–16)
BACTERIA SPEC CULT: NORMAL
BACTERIA SPEC CULT: NORMAL
BASOPHILS # BLD: 0.1 K/UL (ref 0–0.2)
BASOPHILS NFR BLD: 0 % (ref 0–2)
BENZODIAZ UR QL: NEGATIVE
BUN SERPL-MCNC: 4 MG/DL (ref 6–23)
C TRACH RRNA SPEC QL NAA+PROBE: NEGATIVE
CALCIUM SERPL-MCNC: 9.6 MG/DL (ref 8.3–10.4)
CANNABINOIDS UR QL SCN: POSITIVE
CHLORIDE SERPL-SCNC: 103 MMOL/L (ref 98–107)
CO2 SERPL-SCNC: 26 MMOL/L (ref 21–32)
COCAINE UR QL SCN: NEGATIVE
CREAT SERPL-MCNC: 0.7 MG/DL (ref 0.6–1)
DIFFERENTIAL METHOD BLD: ABNORMAL
EOSINOPHIL # BLD: 0 K/UL (ref 0–0.8)
EOSINOPHIL NFR BLD: 0 % (ref 0.5–7.8)
ERYTHROCYTE [DISTWIDTH] IN BLOOD BY AUTOMATED COUNT: 18.4 % (ref 11.9–14.6)
GLUCOSE SERPL-MCNC: 99 MG/DL (ref 65–100)
HCG UR QL: NEGATIVE
HCT VFR BLD AUTO: 33 % (ref 35.8–46.3)
HGB BLD-MCNC: 10.2 G/DL (ref 11.7–15.4)
IMM GRANULOCYTES # BLD AUTO: 0.1 K/UL (ref 0–0.5)
IMM GRANULOCYTES NFR BLD AUTO: 1 % (ref 0–5)
LYMPHOCYTES # BLD: 1.8 K/UL (ref 0.5–4.6)
LYMPHOCYTES NFR BLD: 11 % (ref 13–44)
MCH RBC QN AUTO: 21.6 PG (ref 26.1–32.9)
MCHC RBC AUTO-ENTMCNC: 30.9 G/DL (ref 31.4–35)
MCV RBC AUTO: 69.9 FL (ref 79.6–97.8)
MONOCYTES # BLD: 1.9 K/UL (ref 0.1–1.3)
MONOCYTES NFR BLD: 12 % (ref 4–12)
N GONORRHOEA RRNA SPEC QL NAA+PROBE: POSITIVE
NEUTS SEG # BLD: 12.6 K/UL (ref 1.7–8.2)
NEUTS SEG NFR BLD: 76 % (ref 43–78)
NRBC # BLD: 0 K/UL (ref 0–0.2)
OPIATES UR QL: POSITIVE
PLATELET # BLD AUTO: 300 K/UL (ref 150–450)
PMV BLD AUTO: 9.8 FL (ref 9.4–12.3)
POTASSIUM SERPL-SCNC: 3.8 MMOL/L (ref 3.5–5.1)
RBC # BLD AUTO: 4.72 M/UL (ref 4.05–5.2)
SERVICE CMNT-IMP: NORMAL
SODIUM SERPL-SCNC: 136 MMOL/L (ref 136–145)
SPECIMEN SOURCE: ABNORMAL
WBC # BLD AUTO: 16.5 K/UL (ref 4.3–11.1)

## 2022-06-23 PROCEDURE — 80048 BASIC METABOLIC PNL TOTAL CA: CPT

## 2022-06-23 PROCEDURE — 2580000003 HC RX 258: Performed by: FAMILY MEDICINE

## 2022-06-23 PROCEDURE — 6370000000 HC RX 637 (ALT 250 FOR IP): Performed by: FAMILY MEDICINE

## 2022-06-23 PROCEDURE — 81025 URINE PREGNANCY TEST: CPT

## 2022-06-23 PROCEDURE — 2500000003 HC RX 250 WO HCPCS: Performed by: OBSTETRICS & GYNECOLOGY

## 2022-06-23 PROCEDURE — 2580000003 HC RX 258: Performed by: PHYSICIAN ASSISTANT

## 2022-06-23 PROCEDURE — 85025 COMPLETE CBC W/AUTO DIFF WBC: CPT

## 2022-06-23 PROCEDURE — 1100000000 HC RM PRIVATE

## 2022-06-23 PROCEDURE — 99232 SBSQ HOSP IP/OBS MODERATE 35: CPT | Performed by: OBSTETRICS & GYNECOLOGY

## 2022-06-23 PROCEDURE — 6360000002 HC RX W HCPCS: Performed by: FAMILY MEDICINE

## 2022-06-23 PROCEDURE — 2580000003 HC RX 258: Performed by: OBSTETRICS & GYNECOLOGY

## 2022-06-23 PROCEDURE — 80307 DRUG TEST PRSMV CHEM ANLYZR: CPT

## 2022-06-23 PROCEDURE — 36415 COLL VENOUS BLD VENIPUNCTURE: CPT

## 2022-06-23 RX ADMIN — MORPHINE SULFATE 1 MG: 2 INJECTION, SOLUTION INTRAMUSCULAR; INTRAVENOUS at 01:37

## 2022-06-23 RX ADMIN — CEFTRIAXONE 1000 MG: 1 INJECTION, POWDER, FOR SOLUTION INTRAMUSCULAR; INTRAVENOUS at 11:44

## 2022-06-23 RX ADMIN — MORPHINE SULFATE 1 MG: 2 INJECTION, SOLUTION INTRAMUSCULAR; INTRAVENOUS at 08:34

## 2022-06-23 RX ADMIN — SODIUM CHLORIDE, PRESERVATIVE FREE 3 ML: 5 INJECTION INTRAVENOUS at 20:23

## 2022-06-23 RX ADMIN — METRONIDAZOLE 500 MG: 500 TABLET ORAL at 03:59

## 2022-06-23 RX ADMIN — OXYCODONE 5 MG: 5 TABLET ORAL at 20:23

## 2022-06-23 RX ADMIN — SODIUM CHLORIDE, PRESERVATIVE FREE 10 ML: 5 INJECTION INTRAVENOUS at 20:24

## 2022-06-23 RX ADMIN — METRONIDAZOLE 500 MG: 500 TABLET ORAL at 15:58

## 2022-06-23 RX ADMIN — MORPHINE SULFATE 1 MG: 2 INJECTION, SOLUTION INTRAMUSCULAR; INTRAVENOUS at 12:56

## 2022-06-23 RX ADMIN — SODIUM CHLORIDE, PRESERVATIVE FREE 10 ML: 5 INJECTION INTRAVENOUS at 23:38

## 2022-06-23 RX ADMIN — DOXYCYCLINE 100 MG: 100 INJECTION, POWDER, LYOPHILIZED, FOR SOLUTION INTRAVENOUS at 12:21

## 2022-06-23 RX ADMIN — ACETAMINOPHEN 650 MG: 325 TABLET ORAL at 03:59

## 2022-06-23 RX ADMIN — SODIUM CHLORIDE, PRESERVATIVE FREE 10 ML: 5 INJECTION INTRAVENOUS at 08:33

## 2022-06-23 RX ADMIN — OXYCODONE 5 MG: 5 TABLET ORAL at 16:16

## 2022-06-23 RX ADMIN — SODIUM CHLORIDE, PRESERVATIVE FREE 3 ML: 5 INJECTION INTRAVENOUS at 15:57

## 2022-06-23 RX ADMIN — DOXYCYCLINE 100 MG: 100 INJECTION, POWDER, LYOPHILIZED, FOR SOLUTION INTRAVENOUS at 23:38

## 2022-06-23 ASSESSMENT — PAIN SCALES - GENERAL
PAINLEVEL_OUTOF10: 0
PAINLEVEL_OUTOF10: 8
PAINLEVEL_OUTOF10: 0
PAINLEVEL_OUTOF10: 0
PAINLEVEL_OUTOF10: 8
PAINLEVEL_OUTOF10: 6
PAINLEVEL_OUTOF10: 9
PAINLEVEL_OUTOF10: 0
PAINLEVEL_OUTOF10: 9
PAINLEVEL_OUTOF10: 0

## 2022-06-23 ASSESSMENT — PAIN DESCRIPTION - ORIENTATION
ORIENTATION: LOWER
ORIENTATION: LOWER
ORIENTATION: MID;LOWER
ORIENTATION: MID;LOWER
ORIENTATION: LOWER

## 2022-06-23 ASSESSMENT — PAIN DESCRIPTION - LOCATION
LOCATION: ABDOMEN

## 2022-06-23 ASSESSMENT — PAIN DESCRIPTION - DESCRIPTORS
DESCRIPTORS: ACHING
DESCRIPTORS: SHARP;CRAMPING
DESCRIPTORS: SHARP;SHOOTING

## 2022-06-23 ASSESSMENT — PAIN DESCRIPTION - FREQUENCY
FREQUENCY: CONTINUOUS

## 2022-06-23 ASSESSMENT — PAIN DESCRIPTION - PAIN TYPE
TYPE: ACUTE PAIN

## 2022-06-23 ASSESSMENT — PAIN - FUNCTIONAL ASSESSMENT
PAIN_FUNCTIONAL_ASSESSMENT: ACTIVITIES ARE NOT PREVENTED

## 2022-06-23 ASSESSMENT — PAIN DESCRIPTION - ONSET
ONSET: ON-GOING

## 2022-06-23 NOTE — PROGRESS NOTES
This RN was notified by a tech of smoke coming from pt room. Heavy smell of THC present. Pt denies use of smoke in the room. Educated pt on not using any form of smoking in the hospital setting due to the use of O2. Educated pt on not using illegal substances while taking narcotics.

## 2022-06-23 NOTE — PROGRESS NOTES
AM labs not obtained this AM.  Spoke to lab and they informed me that the pt was stuck x 4 and they were unable to obtain the blood. Pt refused to be stuck again. Spoke with pt and she has agreed to have the lab come again.   Called lab

## 2022-06-23 NOTE — PROGRESS NOTES
Gynecology Progress Note    Patient doing well hospital-op day 3 from PID without significant complaints. Pain controlled on current medication and is overall improved. Voiding without difficulty. States overall fells better    Vitals:  Blood pressure 121/60, pulse 96, temperature 99.1 °F (37.3 °C), temperature source Oral, resp. rate 18, height 5' 5\" (1.651 m), weight 230 lb (104.3 kg), SpO2 98 %. Temp (24hrs), Av.6 °F (37.6 °C), Min:98 °F (36.7 °C), Max:100.5 °F (38.1 °C)        Exam:  Patient without distress. Abdomen soft moderate tender throughout but states improved from admission. Lab/Data Review:  Labs for today not yet drawn    Assessment and Plan:  Patient appears to be clinically improving. Await lab results. Continue antibiotics. Patients questions answered.

## 2022-06-23 NOTE — PLAN OF CARE
Problem: Discharge Planning  Goal: Discharge to home or other facility with appropriate resources  Outcome: Progressing  Flowsheets  Taken 6/23/2022 1910 by Linnette De La Cruz, RN  Discharge to home or other facility with appropriate resources: Arrange for needed discharge resources and transportation as appropriate  Taken 6/23/2022 0825 by Elise Finch RN  Discharge to home or other facility with appropriate resources: Arrange for needed discharge resources and transportation as appropriate     Problem: Pain  Goal: Verbalizes/displays adequate comfort level or baseline comfort level  Outcome: Progressing     Problem: Safety - Adult  Goal: Free from fall injury  Outcome: Progressing     Problem: ABCDS Injury Assessment  Goal: Absence of physical injury  Outcome: Progressing

## 2022-06-23 NOTE — PROGRESS NOTES
END OF SHIFT NOTE:    INTAKE/OUTPUT  06/22 0701 - 06/23 0700  In: 524 [P.O.:590; I.V.:177]  Out: 500 [Urine:500]  Voiding: Yes  Catheter: No  Drain:              Flatus: Patient does have flatus present. Stool: 0 occurrences. Characteristics:  Stool Appearance:  (No stool to assess)        Stool Assessment  Stool Appearance:  (No stool to assess)  Last BM (including prior to admit): 06/19/22 (per pt)    Emesis: 0 occurrences. Characteristics:        VITAL SIGNS  Patient Vitals for the past 12 hrs:   Temp Pulse Resp BP SpO2   06/23/22 0210   18     06/23/22 0006 100.2 °F (37.9 °C) (!) 106 18 129/75 98 %   06/22/22 2130   18     06/22/22 1958 100.4 °F (38 °C) (!) 110 18 (!) 107/57 98 %       Pain Assessment  @BSHSIFLOW(13)@  Pain Location: Abdomen  Patient's Stated Pain Goal: 3    Ambulating  Yes- to the [facility]     Shift report given to oncoming nurse at the bedside.     Linnette De La Cruz RN

## 2022-06-23 NOTE — PROGRESS NOTES
Hospitalist Progress Note   Admit Date:  2022 11:18 AM   Name:  Izabella Adame   Age:  24 y.o. Sex:  female  :  2000   MRN:  [id number]   Room:  218/01    Presenting Complaint: Abdominal Pain     Reason(s) for Admission: Cervicitis and endocervicitis [N72]  PID (pelvic inflammatory disease) [N73.9]  Trichomoniasis [A59.9]  PID (acute pelvic inflammatory disease) [N73.0]  Acute cystitis without hematuria [N30.00]     Hospital Course & Interval History:   Please refer to the admission H&P for details of presentation. In summary, Izabella Adame is a 24 y.o. female with hx of STDs who was admitted due to abdominal pain with heavy menstrual bleeding before expected menses. Work-up shows WBC of 22, wet prep with trichomonas. Ultrasound abdomen with bilateral fallopian tube dilatation with complex fluid suspicious for salpingitis without tubo-ovarian abscess. Was started on IV antibiotics    Subjective/24 hr Events (22) : Patient is seen and examined at bedside. No acute events reported overnight by nursing staff. Temp of 100.5F on  on 1136. Abdominal pain has improved but . Patient denies chest pains, shortness of breath, n/v, abdominal pain. Review of Systems: 10 point review of systems is otherwise negative with the exception of the elements mentioned above. Assessment & Plan:   Sepsis // PID and Salpingitis // UTI   Trichomoniasis  Gonorrhoea  Imaging reviewed. Meets sepsis criteria with leukocytosis and fever. UA with 1+ bacteria wet prep with trichomonas. HIV neg  +Gonorrhea. BCx is neg, UCx is neg  -Gynecology's recommendation  -Follow-up blood cultures, RPR, chlamydia and gonorrhea  -continue with Ceftriaxone, Flagyl, doxycycline  -discussed with patient regarding positive result, abstinence, testing and treatments of her partners    Obesity: Body mass index is 38.27 kg/m². Diet:  ADULT DIET;  Regular  DVT PPx:SCDs  Code status: Full Code      I have reviewed and ordered clinical lab tests and independently visualized images, tracing. I spent 30 minutes of time caring for this patient at bedside or nearby, more than 50 percent of which was spent on coordination of care and/or counseling regarding the disease process, treatment options, and treatment plan. Discussed with patient regarding positive result, abstinence, testing and treatments of her partners          Hospital Problems:  Principal Problem:    PID (acute pelvic inflammatory disease)  Active Problems:    Salpingitis    UTI (urinary tract infection)    Acute blood loss anemia    Menorrhagia    Sepsis ([address]    Trichomoniasis    Gonorrhea, lower  tract, acute  Resolved Problems:    * No resolved hospital problems. *      Objective:     Patient Vitals for the past 24 hrs:   Temp Pulse Resp BP SpO2   06/23/22 1114 99.7 °F (37.6 °C) (!) 105 16 129/85 100 %   06/23/22 0715 99.1 °F (37.3 °C) 96 18 121/60 98 %   06/23/22 0500 99.7 °F (37.6 °C)       06/23/22 0348 100.4 °F (38 °C) 99 19 134/85 97 %   06/23/22 0210   18     06/23/22 0006 100.2 °F (37.9 °C) (!) 106 18 129/75 98 %   06/22/22 2130   18     06/22/22 1958 100.4 °F (38 °C) (!) 110 18 (!) 107/57 98 %   06/22/22 1529 98 °F (36.7 °C) 89 18 118/73 97 %       Oxygen Therapy  SpO2: 100 %  O2 Device: None (Room air)    Estimated body mass index is 38.27 kg/m² as calculated from the following:    Height as of this encounter: 5' 5\" (1.651 m). Weight as of this encounter: 230 lb (104.3 kg). Intake/Output Summary (Last 24 hours) at 6/23/2022 1422  Last data filed at 6/23/2022 0404  Gross per 24 hour   Intake 416.95 ml   Output 500 ml   Net -83.05 ml         Physical Exam:     Blood pressure 129/85, pulse (!) 105, temperature 99.7 °F (37.6 °C), temperature source Oral, resp. rate 16, height 5' 5\" (1.651 m), weight 230 lb (104.3 kg), SpO2 100 %. General:    Well nourished.  Alert and awake   Head:  Normocephalic, atraumatic  Eyes:  Sclerae appear normal.  Pupils equally round. ENT:  Nares appear normal, no drainage. Moist oral mucosa  Neck:  No restricted ROM. Trachea midline   CV:   RRR. No m/r/g. No jugular venous distension. Lungs:   CTAB. No wheezing. Respirations even, unlabored  Abdomen: Bowel sounds present. Soft, +slightly tender to palpation, nondistended. Extremities: No cyanosis or clubbing. No edema  Skin:     No rashes and normal coloration. Warm and dry. Neuro:  CN II-XII grossly intact. A&Ox3  Psych:  Normal mood and affect.       I have reviewed ordered lab tests and independently visualized imaging below:    Recent Labs:  Recent Results (from the past 48 hour(s))   Culture, Blood 1    Collection Time: 06/21/22  2:52 PM    Specimen: Blood   Result Value Ref Range    Special Requests RIGHT  Antecubital        Culture NO GROWTH 2 DAYS     Culture, Blood 1    Collection Time: 06/21/22 11:04 PM    Specimen: Blood   Result Value Ref Range    Special Requests RIGHT  ARM        Culture NO GROWTH 2 DAYS     HIV 1/2 Ag/Ab, 4TH Generation,W Rflx Confirm    Collection Time: 06/21/22 11:04 PM   Result Value Ref Range    HIV 1/2 Interp NONREACTIVE NR      HIV 1/2 Result Comment SEE NOTE     Basic Metabolic Panel w/ Reflex to MG    Collection Time: 06/21/22 11:05 PM   Result Value Ref Range    Sodium 137 136 - 145 mmol/L    Potassium 3.4 (L) 3.5 - 5.1 mmol/L    Chloride 104 98 - 107 mmol/L    CO2 26 21 - 32 mmol/L    Anion Gap 7 7 - 16 mmol/L    Glucose 109 (H) 65 - 100 mg/dL    BUN 8 6 - 23 MG/DL    CREATININE 0.80 0.6 - 1.0 MG/DL    GFR African American >60 >60 ml/min/1.73m2    GFR Non- >60 >60 ml/min/1.73m2    Calcium 9.1 8.3 - 10.4 MG/DL   CBC with Auto Differential    Collection Time: 06/21/22 11:05 PM   Result Value Ref Range    WBC 24.9 (H) 4.3 - 11.1 K/uL    RBC 4.91 4.05 - 5.2 M/uL    Hemoglobin 10.6 (L) 11.7 - 15.4 g/dL    Hematocrit 34.7 (L) 35.8 - 46.3 %    MCV 70.7 (L) 79.6 - 97.8 FL    MCH 21.6 (L) 26.1 - 32.9 PG    MCHC 30.5 (L) 31.4 - 35.0 g/dL    RDW 18.8 (H) 11.9 - 14.6 %    Platelets 692 003 - 705 K/uL    MPV 9.9 9.4 - 12.3 FL    nRBC 0.00 0.0 - 0.2 K/uL    Differential Type AUTOMATED      Seg Neutrophils 86 (H) 43 - 78 %    Lymphocytes 7 (L) 13 - 44 %    Monocytes 6 4.0 - 12.0 %    Eosinophils % 0 (L) 0.5 - 7.8 %    Basophils 0 0.0 - 2.0 %    Immature Granulocytes 1 0.0 - 5.0 %    Segs Absolute 21.4 (H) 1.7 - 8.2 K/UL    Absolute Lymph # 1.7 0.5 - 4.6 K/UL    Absolute Mono # 1.6 (H) 0.1 - 1.3 K/UL    Absolute Eos # 0.0 0.0 - 0.8 K/UL    Basophils Absolute 0.1 0.0 - 0.2 K/UL    Absolute Immature Granulocyte 0.2 0.0 - 0.5 K/UL   Magnesium    Collection Time: 06/21/22 11:05 PM   Result Value Ref Range    Magnesium 1.9 1.8 - 2.4 mg/dL   Basic Metabolic Panel w/ Reflex to MG    Collection Time: 06/23/22 10:21 AM   Result Value Ref Range    Sodium 136 136 - 145 mmol/L    Potassium 3.8 3.5 - 5.1 mmol/L    Chloride 103 98 - 107 mmol/L    CO2 26 21 - 32 mmol/L    Anion Gap 7 7 - 16 mmol/L    Glucose 99 65 - 100 mg/dL    BUN 4 (L) 6 - 23 MG/DL    CREATININE 0.70 0.6 - 1.0 MG/DL    GFR African American >60 >60 ml/min/1.73m2    GFR Non- >60 >60 ml/min/1.73m2    Calcium 9.6 8.3 - 10.4 MG/DL   CBC with Auto Differential    Collection Time: 06/23/22 10:21 AM   Result Value Ref Range    WBC 16.5 (H) 4.3 - 11.1 K/uL    RBC 4.72 4.05 - 5.2 M/uL    Hemoglobin 10.2 (L) 11.7 - 15.4 g/dL    Hematocrit 33.0 (L) 35.8 - 46.3 %    MCV 69.9 (L) 79.6 - 97.8 FL    MCH 21.6 (L) 26.1 - 32.9 PG    MCHC 30.9 (L) 31.4 - 35.0 g/dL    RDW 18.4 (H) 11.9 - 14.6 %    Platelets 216 044 - 011 K/uL    MPV 9.8 9.4 - 12.3 FL    nRBC 0.00 0.0 - 0.2 K/uL    Differential Type AUTOMATED      Seg Neutrophils 76 43 - 78 %    Lymphocytes 11 (L) 13 - 44 %    Monocytes 12 4.0 - 12.0 %    Eosinophils % 0 (L) 0.5 - 7.8 %    Basophils 0 0.0 - 2.0 %    Immature Granulocytes 1 0.0 - 5.0 %    Segs Absolute 12.6 (H) 1.7 - 8.2 K/UL    Absolute Lymph # 1.8 0.5 - 4.6 K/UL    Absolute Mono # 1.9 (H) 0.1 - 1.3 K/UL    Absolute Eos # 0.0 0.0 - 0.8 K/UL    Basophils Absolute 0.1 0.0 - 0.2 K/UL    Absolute Immature Granulocyte 0.1 0.0 - 0.5 K/UL   Drug Screen Psych, Urine    Collection Time: 06/23/22 11:55 AM   Result Value Ref Range    Benzodiazepines, Urine Negative      Opiates, Urine Positive      Amphetamine, Urine Negative      Cocaine, Urine Negative      THC, TH-Cannabinol, Urine Positive     Pregnancy, Urine    Collection Time: 06/23/22 11:55 AM   Result Value Ref Range    HCG(Urine) Pregnancy Test Negative NEG         Other Studies:  US PELVIS LIMITED   Final Result   BILATERAL FALLOPIAN TUBE DILATATION WITH COMPLEX FLUID SUSPICIOUS   FOR SALPINGITIS WITHOUT KASHMIR TUBO-OVARIAN ABSCESS IN THIS CLINICAL SETTING.           Current Meds:  Current Facility-Administered Medications   Medication Dose Route Frequency    doxycycline (VIBRAMYCIN) 100 mg in sodium chloride 0.9 % 100 mL IVPB  100 mg IntraVENous Q12H    sodium chloride flush 0.9 % injection 3 mL  3 mL IntraVENous Q8H    sodium chloride flush 0.9 % injection 5-40 mL  5-40 mL IntraVENous 2 times per day    sodium chloride flush 0.9 % injection 5-40 mL  5-40 mL IntraVENous PRN    0.9 % sodium chloride infusion   IntraVENous PRN    ondansetron (ZOFRAN-ODT) disintegrating tablet 4 mg  4 mg Oral Q8H PRN    Or    ondansetron (ZOFRAN) injection 4 mg  4 mg IntraVENous Q6H PRN    polyethylene glycol (GLYCOLAX) packet 17 g  17 g Oral Daily PRN    acetaminophen (TYLENOL) tablet 650 mg  650 mg Oral Q6H PRN    Or    acetaminophen (TYLENOL) suppository 650 mg  650 mg Rectal Q6H PRN    oxyCODONE (ROXICODONE) immediate release tablet 5 mg  5 mg Oral Q4H PRN    morphine injection 1 mg  1 mg IntraVENous Q4H PRN    cefTRIAXone (ROCEPHIN) 1000 mg IVPB in NS 50ml minibag  1,000 mg IntraVENous Q24H    metroNIDAZOLE (FLAGYL) tablet 500 mg  500 mg Oral Q12H       Signed:  Prince Curtis, MD    Part of this note may have been written by using a voice dictation software. The note has been proof read but may still contain some grammatical/other typographical errors.

## 2022-06-24 VITALS
HEIGHT: 65 IN | RESPIRATION RATE: 18 BRPM | TEMPERATURE: 98.6 F | WEIGHT: 230 LBS | DIASTOLIC BLOOD PRESSURE: 73 MMHG | OXYGEN SATURATION: 100 % | BODY MASS INDEX: 38.32 KG/M2 | SYSTOLIC BLOOD PRESSURE: 128 MMHG | HEART RATE: 86 BPM

## 2022-06-24 PROBLEM — A41.9 SEPSIS (HCC): Status: RESOLVED | Noted: 2022-06-22 | Resolved: 2022-06-24

## 2022-06-24 LAB
ANION GAP SERPL CALC-SCNC: 7 MMOL/L (ref 7–16)
BASOPHILS # BLD: 0.1 K/UL (ref 0–0.2)
BASOPHILS NFR BLD: 1 % (ref 0–2)
BUN SERPL-MCNC: 6 MG/DL (ref 6–23)
CALCIUM SERPL-MCNC: 9.4 MG/DL (ref 8.3–10.4)
CHLORIDE SERPL-SCNC: 103 MMOL/L (ref 98–107)
CO2 SERPL-SCNC: 27 MMOL/L (ref 21–32)
CREAT SERPL-MCNC: 0.6 MG/DL (ref 0.6–1)
DIFFERENTIAL METHOD BLD: ABNORMAL
EOSINOPHIL # BLD: 0.1 K/UL (ref 0–0.8)
EOSINOPHIL NFR BLD: 1 % (ref 0.5–7.8)
ERYTHROCYTE [DISTWIDTH] IN BLOOD BY AUTOMATED COUNT: 18.2 % (ref 11.9–14.6)
GLUCOSE SERPL-MCNC: 94 MG/DL (ref 65–100)
HCT VFR BLD AUTO: 32.6 % (ref 35.8–46.3)
HGB BLD-MCNC: 10 G/DL (ref 11.7–15.4)
IMM GRANULOCYTES # BLD AUTO: 0.1 K/UL (ref 0–0.5)
IMM GRANULOCYTES NFR BLD AUTO: 1 % (ref 0–5)
LYMPHOCYTES # BLD: 2.3 K/UL (ref 0.5–4.6)
LYMPHOCYTES NFR BLD: 20 % (ref 13–44)
MCH RBC QN AUTO: 21.5 PG (ref 26.1–32.9)
MCHC RBC AUTO-ENTMCNC: 30.7 G/DL (ref 31.4–35)
MCV RBC AUTO: 70.1 FL (ref 79.6–97.8)
MONOCYTES # BLD: 1.6 K/UL (ref 0.1–1.3)
MONOCYTES NFR BLD: 14 % (ref 4–12)
NEUTS SEG # BLD: 7.3 K/UL (ref 1.7–8.2)
NEUTS SEG NFR BLD: 63 % (ref 43–78)
NRBC # BLD: 0 K/UL (ref 0–0.2)
PLATELET # BLD AUTO: 299 K/UL (ref 150–450)
PMV BLD AUTO: 10.4 FL (ref 9.4–12.3)
POTASSIUM SERPL-SCNC: 4.1 MMOL/L (ref 3.5–5.1)
RBC # BLD AUTO: 4.65 M/UL (ref 4.05–5.2)
RPR SER QL: NON REACTIVE
SODIUM SERPL-SCNC: 137 MMOL/L (ref 136–145)
WBC # BLD AUTO: 11.4 K/UL (ref 4.3–11.1)

## 2022-06-24 PROCEDURE — 6370000000 HC RX 637 (ALT 250 FOR IP): Performed by: FAMILY MEDICINE

## 2022-06-24 PROCEDURE — 85025 COMPLETE CBC W/AUTO DIFF WBC: CPT

## 2022-06-24 PROCEDURE — 36415 COLL VENOUS BLD VENIPUNCTURE: CPT

## 2022-06-24 PROCEDURE — 2580000003 HC RX 258: Performed by: FAMILY MEDICINE

## 2022-06-24 PROCEDURE — 2580000003 HC RX 258: Performed by: PHYSICIAN ASSISTANT

## 2022-06-24 PROCEDURE — 80048 BASIC METABOLIC PNL TOTAL CA: CPT

## 2022-06-24 RX ORDER — DOXYCYCLINE HYCLATE 100 MG
100 TABLET ORAL 2 TIMES DAILY
Qty: 24 TABLET | Refills: 0 | Status: SHIPPED | OUTPATIENT
Start: 2022-06-24 | End: 2022-07-06

## 2022-06-24 RX ORDER — OXYCODONE HYDROCHLORIDE 5 MG/1
5 TABLET ORAL EVERY 6 HOURS PRN
Qty: 8 TABLET | Refills: 0 | Status: SHIPPED | OUTPATIENT
Start: 2022-06-24 | End: 2022-06-26

## 2022-06-24 RX ORDER — METRONIDAZOLE 500 MG/1
500 TABLET ORAL EVERY 12 HOURS
Qty: 24 TABLET | Refills: 0 | Status: SHIPPED | OUTPATIENT
Start: 2022-06-24 | End: 2022-07-06

## 2022-06-24 RX ADMIN — OXYCODONE 5 MG: 5 TABLET ORAL at 07:37

## 2022-06-24 RX ADMIN — SODIUM CHLORIDE, PRESERVATIVE FREE 10 ML: 5 INJECTION INTRAVENOUS at 11:12

## 2022-06-24 RX ADMIN — METRONIDAZOLE 500 MG: 500 TABLET ORAL at 03:58

## 2022-06-24 RX ADMIN — SODIUM CHLORIDE, PRESERVATIVE FREE 3 ML: 5 INJECTION INTRAVENOUS at 03:58

## 2022-06-24 ASSESSMENT — PAIN - FUNCTIONAL ASSESSMENT: PAIN_FUNCTIONAL_ASSESSMENT: ACTIVITIES ARE NOT PREVENTED

## 2022-06-24 ASSESSMENT — PAIN DESCRIPTION - FREQUENCY: FREQUENCY: CONTINUOUS

## 2022-06-24 ASSESSMENT — PAIN DESCRIPTION - PAIN TYPE: TYPE: ACUTE PAIN

## 2022-06-24 ASSESSMENT — PAIN DESCRIPTION - DESCRIPTORS: DESCRIPTORS: ACHING

## 2022-06-24 ASSESSMENT — PAIN DESCRIPTION - LOCATION: LOCATION: ABDOMEN

## 2022-06-24 ASSESSMENT — PAIN SCALES - GENERAL
PAINLEVEL_OUTOF10: 2
PAINLEVEL_OUTOF10: 5
PAINLEVEL_OUTOF10: 0

## 2022-06-24 ASSESSMENT — PAIN DESCRIPTION - ONSET: ONSET: ON-GOING

## 2022-06-24 ASSESSMENT — PAIN DESCRIPTION - ORIENTATION: ORIENTATION: LOWER;MID

## 2022-06-24 NOTE — PROGRESS NOTES
D/c paperwork reviewed with pt. PIV removed. All questions answered. Education given to pt about finishing at home abx and how important it is to finish all 12 days. All educated pt that anyone that she is in sexual contact with needs to get tested and treated. Prescriptions given to pt. No further questions at this time. No acute signs of distress.

## 2022-06-24 NOTE — PROGRESS NOTES
This nurse was not notified last night that lab stuck pt 4 times and was unable to draw morning labs.

## 2022-06-24 NOTE — PROGRESS NOTES
Patient with discharge orders today. Patient's family to provide transportation. Patient has met all treatment goals and milestones. No supportive needs identified to CM. CM following until discharged. ASSESSMENT NOTE    Attending Physician: Shirin Teran MD  Admit Problem: Cervicitis and endocervicitis [N72]  PID (pelvic inflammatory disease) [N73.9]  Trichomoniasis [A59.9]  PID (acute pelvic inflammatory disease) [N73.0]  Acute cystitis without hematuria [N30.00]  Date/Time of Admission: 6/21/2022 11:18 AM  Problem List:  Patient Active Problem List   Diagnosis    PID (acute pelvic inflammatory disease)    Salpingitis    UTI (urinary tract infection)    Acute blood loss anemia    Menorrhagia    Trichomoniasis    Gonorrhea, lower  tract, acute       Service Assessment  Patient Orientation Alert and Oriented   Cognition Alert   History Provided By Patient   Primary Caregiver Self   Accompanied By/Relationship     Support Systems Parent (patient's parent:  Janel Willard [phone])   [address] [facility] is: Legal Next of [address] 69   PCP Verified by CM     Last Visit to PCP     Prior Functional Level Independent in ADLs/IADLs   Current Functional Level     Can patient return to prior living arrangement Yes   Ability to make needs known: Good   Family able to assist with home care needs:     Would you like for me to discuss the discharge plan with any other family members/significant others, and if so, who?      Financial Resources     Community Resources     CM/SW Referral No PCP     Social/Functional History  Lives With Parent   Type of Home Apartment   Home Layout One level   Home Access Level entry   Entrance Stairs - Number of Steps     Entrance Stairs - Rails     Bathroom Shower/Tub Tub/Shower unit   Bathroom Toilet     Bathroom Equipment     Bathroom Accessibility     Home Equipment     Receives Help From Family   ADL Assistance Independent   [place] Feeding     Toileting     Homemaking Assistance Independent   Meal Prep     Laundry     Vacuuming     Cleaning     Gardening     Yard Work     Driving     Shopping          Other (Comment)     [address] [place] Paying/Finance Responsibility     [place] [address] Management     Other (Comment)     Ambuation Assistance Independent   Transfer Assisstance Independent   Active  Yes   Patient's  Info     Mode of Transportation Car   Education     Occupation Part time employment   Type of Occupation [employer]     Discharge Planning   Type of Residence Apartment   Living Arrangements Family Members   Mark Middleton Parent (patient's parent:  Frank García [phone])   Current Services Prior To Admission None   Potential Assistance Needed N/A   DME     DME     DME Ordered? No   Potential Assistance Purchasing Medications No   Meds-to-Beds: Does the patient want to have any new prescriptions delivered to bedside prior to discharge? Type of Home Care Services None   Patient expects to be discharged to: Follow Up Appointment: Best Day/Time     One/Two Story Residence: One story   # of Interior Steps     Height of Each Step (in)     [employer] Available     History of Falls? Services At/After Discharge  Transition of Care Consult (CM Consult): Discharge Planning   Internal Home Health     Internal Hospice     Reason Outside Agency [address]     Partner SNF     Reason Why Partner SNF Not Chosen     Internal Comfort Care     Reason Outside [address] Discharge None   [place] Resource Information Provided?  No   Mode of [address] at [address]charge [address] Time of [address]charge     Confirm Follow Up Transport Self     Condition of Participation: Discharge Planning  The plan for Transition of Care is related to the following treatment goals: return to baseline   The Patient and/or Patient Representative was provided with a Choice of Provider? Patient   Name of the Patient Representative who was provided with the Choice of Provider and agrees with the Discharge Plan? The Patient and/or Patient Representative Agree with the Discharge Plan? Yes   Freedom of Choice list was provided with basic dialogue that supports the individualized plan of care/goals, treatment preferences, and shares the quality data associated with the providers?  Yes     Documentation for Discharge Appeal  Discharge Appealed by     Date notified by [facility]IO of appeal request:     Time notified by QIO of appeal request:     Detailed Notice of Discharge given to:     Date Notice of Discharge given:     Time Notice of Discharge given:     [facility]     [facility]     Date Notified of Outcome     Time Notified of Outcome     Outcome of appeal           Sho Kang RN 06/24/22 11:21 AM

## 2022-06-24 NOTE — DISCHARGE SUMMARY
Hospitalist Discharge Summary   Admit Date:  2022 11:18 AM   DC Note date: 2022  Name:  Jimmy Devlin   Age:  24 y.o. Sex:  female  :  2000   MRN:  [id number]   Room:  [facility]/  PCP:  None Provider    Presenting Complaint: Abdominal Pain     Initial Admission Diagnosis: Cervicitis and endocervicitis [N72]  PID (pelvic inflammatory disease) [N73.9]  Trichomoniasis [A59.9]  PID (acute pelvic inflammatory disease) [N73.0]  Acute cystitis without hematuria [N30.00]     Problem List for this Hospitalization (present on admission):    Principal Problem:    PID (acute pelvic inflammatory disease)  Active Problems:    Salpingitis    UTI (urinary tract infection)    Acute blood loss anemia    Menorrhagia    Trichomoniasis    Gonorrhea, lower  tract, acute  Resolved Problems:    Sepsis ([address]    Did Patient have Sepsis (YES OR NO): Yes    Hospital Course:  Please refer to the admission H&P for details of presentation. In summary, Jimmy Devlin is a 24 y.o. female with hx of STDs who was admitted due to abdominal pain with heavy menstrual bleeding before expected menses. Work-up shows WBC of 22, wet prep with trichomonas. Ultrasound abdomen with bilateral fallopian tube dilatation with complex fluid suspicious for salpingitis without tubo-ovarian abscess. Patient was started on IV antibiotics. Patient's gonorrhea is positive. Negative chlamydia and HIV test.  Patient has been afebrile for over 24 hours with improvement in her abdominal pain. Patient will need to complete her course of antibiotics and follow-up with call Gynocology office within 1 to 2 weeks. Patient has been educated abstain from sexual activities until she is fully treated. Patient is also educated to let her partners know so that they can be tested and treated properly. Patient is medically stable for discharge. Patient is to continue taking medications as prescribed and to follow up with PCP on discharge.   Patient is instructed to to call a physician or return to ED if any concerns/symptoms worsened. Discharge summary and encounter summary was sent to PCP electronically via \"Comm Mgt\" link in [facility], if possible. Disposition: Home  Diet: ADULT DIET; Regular  Code Status: Full Code    Follow Ups:   Follow-up Information     WILFRID Ramon MD In 1 week. Specialties: Obstetrics & Gynecology, Gynecology, Obstetrics  Why: Office will call for an appointment  Contact information:  [address] [place] [address]  [phone]                     Time spent in patient discharge and coordination 35 minutes. Plan was discussed with patient. All questions answered. Patient was stable at time of discharge. Instructions given to call a physician or return if any concerns. Current Discharge Medication List      START taking these medications    Details   oxyCODONE (ROXICODONE) 5 MG immediate release tablet Take 1 tablet by mouth every 6 hours as needed for Pain for up to 2 days. Qty: 8 tablet, Refills: 0    Comments: Reduce doses taken as pain becomes manageable  Associated Diagnoses: Salpingitis; PID (acute pelvic inflammatory disease)      metroNIDAZOLE (FLAGYL) 500 MG tablet Take 1 tablet by mouth every 12 hours for 12 days  Qty: 24 tablet, Refills: 0      doxycycline hyclate (VIBRA-TABS) 100 MG tablet Take 1 tablet by mouth 2 times daily for 12 days  Qty: 24 tablet, Refills: 0             Procedures done this admission:  * No surgery found *    Consults this admission:  IP CONSULT TO PHARMACY  IP CONSULT TO OB GYN    Echocardiogram results:  No results found for this or any previous visit. Diagnostic Imaging/Tests:   US PELVIS LIMITED    Result Date: 6/21/2022  TRANSABDOMINAL AND TRANSVAGINAL PELVIC SONOGRAPHY CLINICAL HISTORY: Nulliparous 80-year-old with last known menstrual period of June 7, 2022 presents with negative pregnancy test and severe pelvic pain for 2 days.   Now leukocytosis and microscopic hematuria. There is clinical concern for tubo-ovarian abscess. COMPARISON:  None. FINDINGS:  Transabdominal and transvaginal images show that the uterus is normal in size, contour, and echogenicity. It measures 7.7 x 3.5 x 5.4 cm with endometrial stripe of 2 mm. No myometrial mass is identified. The right ovary measures 4.0 x 2.0 x 3.1 cm and is normal in contour and echogenicity. The left ovary measures 4.8 x 3.6 x 4.2 cm in association with a 3.4 cm cyst.  However, there are bilateral adnexal tubular structures with complex fluid with diameters measuring up to 1.4 cm on the right and 1.7 cm on the left suggesting possible salpingitis. Minimal complex fluid in the pelvic cul-de-sac. BILATERAL FALLOPIAN TUBE DILATATION WITH COMPLEX FLUID SUSPICIOUS FOR SALPINGITIS WITHOUT KASHMIR TUBO-OVARIAN ABSCESS IN THIS CLINICAL SETTING.         Labs: Results:       BMP, Mg, Phos Recent Labs     06/21/22  2305 06/23/22  1021 06/24/22  0731    136 137   K 3.4* 3.8 4.1    103 103   CO2 26 26 27   BUN 8 4* 6   MG 1.9  --   --       CBC Recent Labs     06/21/22  2305 06/23/22  1021 06/24/22  0731   WBC 24.9* 16.5* 11.4*   RBC 4.91 4.72 4.65   HGB 10.6* 10.2* 10.0*   HCT 34.7* 33.0* 32.6*    300 299      LFT Recent Labs     06/21/22  1245   ALT 16   GLOB 4.8*      Cardiac Testing No results found for: BNP, CPK, RCK1, CKMB   Coagulation Tests No results found for: INR, APTT   A1c No results found for: HBA1C   Lipid Panel No results found for: CHOL, CHOLPOCT, CHOLX, CHLST, CHOLV, 298165, HDL, HDLC, LDL, LDLC, 008792, VLDLC, VLDL, TGLX, TRIGL   Thyroid Panel No results found for: TSH, T4, FT4     Most Recent UA Lab Results   Component Value Date    UCOM RESULTS VERIFIED MANUALLY 06/21/2022          All Labs from Last 24 Hrs:  Recent Results (from the past 24 hour(s))   Drug Screen Psych, Urine    Collection Time: 06/23/22 11:55 AM   Result Value Ref Range    Benzodiazepines, Urine Negative      Opiates, Urine Positive      Amphetamine, Urine Negative      Cocaine, Urine Negative      THC, TH-Cannabinol, Urine Positive     Pregnancy, Urine    Collection Time: 06/23/22 11:55 AM   Result Value Ref Range    HCG(Urine) Pregnancy Test Negative NEG     Basic Metabolic Panel w/ Reflex to MG    Collection Time: 06/24/22  7:31 AM   Result Value Ref Range    Sodium 137 136 - 145 mmol/L    Potassium 4.1 3.5 - 5.1 mmol/L    Chloride 103 98 - 107 mmol/L    CO2 27 21 - 32 mmol/L    Anion Gap 7 7 - 16 mmol/L    Glucose 94 65 - 100 mg/dL    BUN 6 6 - 23 MG/DL    CREATININE 0.60 0.6 - 1.0 MG/DL    GFR African American >60 >60 ml/min/1.73m2    GFR Non- >60 >60 ml/min/1.73m2    Calcium 9.4 8.3 - 10.4 MG/DL   CBC with Auto Differential    Collection Time: 06/24/22  7:31 AM   Result Value Ref Range    WBC 11.4 (H) 4.3 - 11.1 K/uL    RBC 4.65 4.05 - 5.2 M/uL    Hemoglobin 10.0 (L) 11.7 - 15.4 g/dL    Hematocrit 32.6 (L) 35.8 - 46.3 %    MCV 70.1 (L) 79.6 - 97.8 FL    MCH 21.5 (L) 26.1 - 32.9 PG    MCHC 30.7 (L) 31.4 - 35.0 g/dL    RDW 18.2 (H) 11.9 - 14.6 %    Platelets 350 384 - 413 K/uL    MPV 10.4 9.4 - 12.3 FL    nRBC 0.00 0.0 - 0.2 K/uL    Differential Type AUTOMATED      Seg Neutrophils 63 43 - 78 %    Lymphocytes 20 13 - 44 %    Monocytes 14 (H) 4.0 - 12.0 %    Eosinophils % 1 0.5 - 7.8 %    Basophils 1 0.0 - 2.0 %    Immature Granulocytes 1 0.0 - 5.0 %    Segs Absolute 7.3 1.7 - 8.2 K/UL    Absolute Lymph # 2.3 0.5 - 4.6 K/UL    Absolute Mono # 1.6 (H) 0.1 - 1.3 K/UL    Absolute Eos # 0.1 0.0 - 0.8 K/UL    Basophils Absolute 0.1 0.0 - 0.2 K/UL    Absolute Immature Granulocyte 0.1 0.0 - 0.5 K/UL       No Known Allergies    There is no immunization history on file for this patient.     Recent Vital Data:  Patient Vitals for the past 24 hrs:   Temp Pulse Resp BP SpO2   06/24/22 0731 99 °F (37.2 °C) 93 17 126/77 99 %   06/24/22 0502 98.8 °F (37.1 °C) 91 19 116/77 97 %   06/24/22 0002 99.9 °F (37.7 °C) 93 18 125/70 97 %   06/23/22 2053   18     06/23/22 2015  85 18 135/85 99 %   06/23/22 2011 100 °F (37.8 °C) (!) 119 18 (!) 121/110 94 %   06/23/22 1616 98.8 °F (37.1 °C) 78  111/72 99 %       Oxygen Therapy  SpO2: 99 %  O2 Device: None (Room air)    Estimated body mass index is 38.27 kg/m² as calculated from the following:    Height as of this encounter: 5' 5\" (1.651 m). Weight as of this encounter: 230 lb (104.3 kg). Intake/Output Summary (Last 24 hours) at 6/24/2022 1118  Last data filed at 6/24/2022 0900  Gross per 24 hour   Intake 314 ml   Output 800 ml   Net -486 ml         Physical Exam:    General:          Well nourished. Alert and awake   Head:               Normocephalic, atraumatic  Eyes:               Sclerae appear normal.  Pupils equally round. ENT:                Nares appear normal, no drainage. Moist oral mucosa  Neck:               No restricted ROM. Trachea midline   CV:                  RRR. No m/r/g. No jugular venous distension. Lungs:             CTAB. No wheezing. Respirations even, unlabored  Abdomen: Bowel sounds present. Soft, +slightly tender to palpation, nondistended. Extremities:     No cyanosis or clubbing. No edema  Skin:                No rashes and normal coloration. Warm and dry. Neuro:             CN II-XII grossly intact. A&Ox3  Psych:             Normal mood and affect. Signed:  Claudette Nelson MD    Part of this note may have been written by using a voice dictation software. The note has been proof read but may still contain some grammatical/other typographical errors.

## 2022-06-24 NOTE — PROGRESS NOTES
END OF SHIFT NOTE:    INTAKE/OUTPUT  06/23 0701 - 06/24 0700  In: 314 [I.V.:314]  Out: 800 [Urine:800]  Voiding: Yes  Catheter: No  Drain:  N/A            Flatus: Patient does have flatus present. Stool: 0 occurrences. Characteristics:  Stool Appearance: Unable to assess (no stool to assess)        Stool Assessment  Stool Appearance: Unable to assess (no stool to assess)  Last BM (including prior to admit): 06/23/22 (per pt)    Emesis:  occurrences. Characteristics:        VITAL SIGNS  Patient Vitals for the past 12 hrs:   Temp Pulse Resp BP SpO2   06/24/22 0002 99.9 °F (37.7 °C) 93 18 125/70 97 %   06/23/22 2053   18     06/23/22 2015  85 18 135/85 99 %   06/23/22 2011 100 °F (37.8 °C) (!) 119 18 (!) 121/110 94 %   06/23/22 1616 98.8 °F (37.1 °C) 78  111/72 99 %       Pain Assessment  @BSHSIFLOW(13)@  Pain Location: Abdomen  Patient's Stated Pain Goal: 3    Ambulating  Yes to the [facility]    Shift report given to oncoming nurse at the bedside.     Gissel Pascual RN

## 2022-06-25 NOTE — PROGRESS NOTES
[facility] 2 SURGICAL  [address]  Phone: [phone]             June 25, 2022    Patient: Rainer Rebolledo   YOB: 2000   Date of Visit: 6/21/2022       To Whom It May Concern:    Rainer Rebolledo was seen and treated in our facility  beginning 6/21/2022 until June, 24th, 2022. She may return to work on June 25th, 2022.       Sincerely,       Mila Yi RN        Signature:__________________________________

## 2022-06-26 LAB
BACTERIA SPEC CULT: NORMAL
BACTERIA SPEC CULT: NORMAL
SERVICE CMNT-IMP: NORMAL
SERVICE CMNT-IMP: NORMAL

## 2022-07-08 NOTE — PROGRESS NOTES
Pantera Brunson  is a 24 y.o. female, . , Patient's last menstrual period was 2022 (approximate). , who is seen for ER follow-up. The patient presented to the emergency room on 22 with complaints of severe lower abdominal pain and vaginal bleeding before expected menses. After completing work-up she was diagnosed with Trichomonas, cervicitis and endocervicitis, acute cystitis without hematuria, and PID. Ultrasound confirms bilateral salpingitis w/o TOA. She was given multiple IV antibiotics in the hospital; Doxycycline, Clindamycin, Ampicillin-Sulbactam, Flagyl, Rocephin. She was discharged home with oral antibiotics. States that she is on the last day of her antibiotics. No issues today besides feeling like her period is about to start. She last had intercourse last night. Admit Date: 2022   DC Note date: 2022     She states she feels completely well at this point with no residual discomfort or any other concerns. Would like to discuss options of birth control, thinking about the IUD however she does not have insurance and she is concerned about weight gain. Discussed with her do not feel she is a candidate for an IUD due to her recent PID and the rationale for this plan was discussed with her. She is also concerned about future fertility. HISTORY:  Sexual History:  has sex with males  Contraception:  none  No current outpatient medications on file prior to visit. No current facility-administered medications on file prior to visit. ROS:  Review of Ulises Jerez  has a past medical history of Acute cystitis without hematuria, Cervicitis and endocervicitis, HSV-1 (herpes simplex virus 1) infection, PID (acute pelvic inflammatory disease), and Trichomonas infection. .    Previous surgeries include  has no past surgical history on file. Seferino Poon Her current meds are No current outpatient medications on file.      Family history is significant for family history includes Diabetes in her father; High Cholesterol in her father; Hypertension in her father. .       PHYSICAL EXAM:  Blood pressure 132/80, height 5' 5\" (1.651 m), weight 252 lb 6.4 oz (114.5 kg), last menstrual period 06/08/2022, not currently breastfeeding. OBGyn Exam     ASSESSMENT:  Encounter Diagnoses   Name Primary?  Gonorrhea, lower  tract, acute Yes    PID (acute pelvic inflammatory disease)     Salpingitis        PLAN:  Abdias that this point she appears to have resolution of her symptoms. Did discussed the concern about tubal function after PID. Would like to get her back in approximately 6 weeks to repeat ultrasound to evaluate her tube since there is evidence of salpingitis on the hospital ultrasound. Discussed contraceptive options and she is not interested in anything at this time. No orders of the defined types were placed in this encounter.

## 2022-07-11 ENCOUNTER — OFFICE VISIT (OUTPATIENT)
Dept: OBGYN CLINIC | Age: 22
End: 2022-07-11

## 2022-07-11 VITALS
WEIGHT: 252.4 LBS | DIASTOLIC BLOOD PRESSURE: 80 MMHG | BODY MASS INDEX: 42.05 KG/M2 | HEIGHT: 65 IN | SYSTOLIC BLOOD PRESSURE: 132 MMHG

## 2022-07-11 DIAGNOSIS — N70.91 SALPINGITIS: ICD-10-CM

## 2022-07-11 DIAGNOSIS — N73.0 PID (ACUTE PELVIC INFLAMMATORY DISEASE): ICD-10-CM

## 2022-07-11 DIAGNOSIS — A54.00: Primary | ICD-10-CM

## 2022-07-11 PROCEDURE — 99213 OFFICE O/P EST LOW 20 MIN: CPT | Performed by: OBSTETRICS & GYNECOLOGY

## 2022-07-21 PROBLEM — N39.0 UTI (URINARY TRACT INFECTION): Status: RESOLVED | Noted: 2022-06-21 | Resolved: 2022-07-21

## 2022-08-31 ENCOUNTER — OFFICE VISIT (OUTPATIENT)
Dept: OBGYN CLINIC | Age: 22
End: 2022-08-31

## 2022-08-31 VITALS
BODY MASS INDEX: 42.65 KG/M2 | DIASTOLIC BLOOD PRESSURE: 80 MMHG | HEIGHT: 65 IN | WEIGHT: 256 LBS | SYSTOLIC BLOOD PRESSURE: 126 MMHG

## 2022-08-31 DIAGNOSIS — N73.0 PID (ACUTE PELVIC INFLAMMATORY DISEASE): Primary | ICD-10-CM

## 2022-08-31 DIAGNOSIS — N70.91 SALPINGITIS: ICD-10-CM

## 2022-08-31 PROCEDURE — 76830 TRANSVAGINAL US NON-OB: CPT | Performed by: OBSTETRICS & GYNECOLOGY

## 2022-08-31 PROCEDURE — 99213 OFFICE O/P EST LOW 20 MIN: CPT | Performed by: OBSTETRICS & GYNECOLOGY

## 2022-08-31 NOTE — PROGRESS NOTES
Torrey Freeman  is a 24 y.o. female, Rudolph , Patient's last menstrual period was 08/19/2022.,  who is seen for gyn ultrasound performed secondary to evaluate her tube since there is evidence of salpingitis on the hospital ultrasound. Ultrasound findings from today:  Cervix appears within normal limits. Uterus volume= 61.63 mL. Uterus is anteverted and homogeneous. Endo= 7.4 mm, no intracavitary masses visualized. ROV is visualized with follicles, simple cyst probable dominant follicle measuring 1.2 x 1.0 x 1.4 cm and a complex cyst measuring 1.4 x 0.6 x 1.1 cm. LOV is visualized with follicles and simple cyst measuring 3.1 x 2.1 x 3.7 cm (previous longest measurement 3.4 cm). Tubular structure noted in bilateral adnexa with simple fluid (RT) measuring 1.9 x 0.6 x 1.1 cm, (LT) measuring 1.9 x 0.5 x 1.0 cm. HISTORY:  Sexual History:  has sex with males  Contraception:  none  No current outpatient medications on file prior to visit. No current facility-administered medications on file prior to visit. ROS:  Review of Kobe Mace  has a past medical history of Acute cystitis without hematuria, Cervicitis and endocervicitis, HSV-1 (herpes simplex virus 1) infection, PID (acute pelvic inflammatory disease), and Trichomonas infection. .    Previous surgeries include  has no past surgical history on file. Manrich Rocha Her current meds are No current outpatient medications on file. Family history is significant for family history includes Diabetes in her father; High Cholesterol in her father; Hypertension in her father. .       PHYSICAL EXAM:  Blood pressure 126/80, height 5' 5\" (1.651 m), weight 256 lb (116.1 kg), last menstrual period 08/19/2022, not currently breastfeeding. OBGyn Exam   The patient appears well, alert, oriented x 3, in no distress. .    ASSESSMENT:  Encounter Diagnoses   Name Primary?     PID (acute pelvic inflammatory disease) Yes    Salpingitis        PLAN:  Discussed findings on ultrasound today showed continued thickening of the tubes. Discussed this is not increased at all and may just be due to inflammation. Discussed concern for scarring. At this point she is asymptomatic and is not interested in conceiving right at this time. Discussed recommend repeat ultrasound in approximately 6 months to see if the tubes will improve. All of her questions were answered we will see her back at that time. Orders Placed This Encounter   Procedures    AMB POC US, TRANSVAGINAL     Order Specific Question:   Reason for Exam:     Answer:   GYN US     Order Specific Question:   Are you Pregnant? Answer:    No

## 2023-01-03 ENCOUNTER — OFFICE VISIT (OUTPATIENT)
Dept: OBGYN CLINIC | Age: 23
End: 2023-01-03
Payer: COMMERCIAL

## 2023-01-03 VITALS — WEIGHT: 268.2 LBS | BODY MASS INDEX: 44.68 KG/M2 | HEIGHT: 65 IN

## 2023-01-03 DIAGNOSIS — L68.0 HIRSUTISM: ICD-10-CM

## 2023-01-03 DIAGNOSIS — N70.91 SALPINGITIS: ICD-10-CM

## 2023-01-03 DIAGNOSIS — E66.01 CLASS 3 SEVERE OBESITY WITH BODY MASS INDEX (BMI) OF 40.0 TO 44.9 IN ADULT, UNSPECIFIED OBESITY TYPE, UNSPECIFIED WHETHER SERIOUS COMORBIDITY PRESENT (HCC): ICD-10-CM

## 2023-01-03 DIAGNOSIS — N73.0 PID (ACUTE PELVIC INFLAMMATORY DISEASE): ICD-10-CM

## 2023-01-03 DIAGNOSIS — N92.6 IRREGULAR MENSES: Primary | ICD-10-CM

## 2023-01-03 LAB
FSH SERPL-ACNC: 6.3 MIU/ML
HCG SERPL-ACNC: <1 MIU/ML (ref 0–6)
LH SERPL-ACNC: 8.7 MIU/ML

## 2023-01-03 PROCEDURE — 99214 OFFICE O/P EST MOD 30 MIN: CPT | Performed by: OBSTETRICS & GYNECOLOGY

## 2023-01-03 NOTE — PROGRESS NOTES
Jasmyne Huff  is a 25 y.o. female, Ashley Rowan, Patient's last menstrual period was 12/22/2022 (exact date). ,  who is seen for a talk. Would like to discuss recent period. States that she had spotting on 12/22/2022; lasted 4 days; light in flow. Took first pregnancy test about 2 days after spotting spotted and it was positive. Then 3 days later took another pregnancy test and it was negative. Periods are normally monthly; moderate/ heavy flow; last 4-6 days. This is typically when her period would start but the flow never increased, just spotting. She also states she had a lot of cramping with the spotting and this is very uncommon for her. Complains of frequency with urination no dysuria, facial hair, acne that is located on her thighs and face. HISTORY:  Sexual History:  has sex with males  Contraception:  none  No current outpatient medications on file prior to visit. No current facility-administered medications on file prior to visit. ROS:  Review of Anita De Leon  has a past medical history of Acute cystitis without hematuria, Cervicitis and endocervicitis, HSV-1 (herpes simplex virus 1) infection, PID (acute pelvic inflammatory disease), and Trichomonas infection. .    Previous surgeries include  has no past surgical history on file. Matthew Payan Her current meds are No current outpatient medications on file. Family history is significant for family history includes Diabetes in her father; High Cholesterol in her father; Hypertension in her father. .       PHYSICAL EXAM:  Height 5' 5\" (1.651 m), weight 268 lb 3.2 oz (121.7 kg), last menstrual period 12/22/2022, not currently breastfeeding. OBGyn Exam   The patient appears well, alert, oriented x 3, in no distress. Urine pregnancy test today is negative. ASSESSMENT:  Encounter Diagnoses   Name Primary?     Irregular menses Yes    Salpingitis     PID (acute pelvic inflammatory disease)     Hirsutism     Class 3 severe obesity with body mass index (BMI) of 40.0 to 44.9 in adult, unspecified obesity type, unspecified whether serious comorbidity present [facility])        PLAN:  Discussed with patient is uncertain whether this represents a false positive pregnancy test is now negative or a chemical pregnancy that was lost.  Discussed we will not know the answer to this. We will go ahead and look at hormones due to her other symptoms. We will also bring her back for an ultrasound which was previously planned due to her history of PID and salpingitis. We will go over those results and make a plan based on those findings.   Orders Placed This Encounter   Procedures    Follicle Stimulating Hormone     Standing Status:   Future     Number of Occurrences:   1     Standing Expiration Date:   1/3/2024    Luteinizing Hormone     Standing Status:   Future     Number of Occurrences:   1     Standing Expiration Date:   1/3/2024    Testosterone, free, total     Standing Status:   Future     Number of Occurrences:   1     Standing Expiration Date:   1/3/2024    HCG, Quantitative, Pregnancy     Standing Status:   Future     Number of Occurrences:   1     Standing Expiration Date:   1/3/2024

## 2023-01-06 LAB — TESTOST FREE SERPL-MCNC: 1.6 PG/ML (ref 0–4.2)

## 2023-01-25 ENCOUNTER — OFFICE VISIT (OUTPATIENT)
Dept: OBGYN CLINIC | Age: 23
End: 2023-01-25
Payer: COMMERCIAL

## 2023-01-25 VITALS
WEIGHT: 269 LBS | HEIGHT: 65 IN | BODY MASS INDEX: 44.82 KG/M2 | DIASTOLIC BLOOD PRESSURE: 82 MMHG | SYSTOLIC BLOOD PRESSURE: 124 MMHG

## 2023-01-25 DIAGNOSIS — N92.6 IRREGULAR MENSES: ICD-10-CM

## 2023-01-25 DIAGNOSIS — N83.202 CYST OF OVARY, LEFT: Primary | ICD-10-CM

## 2023-01-25 DIAGNOSIS — L68.9 EXCESSIVE HAIR GROWTH: ICD-10-CM

## 2023-01-25 DIAGNOSIS — N73.0 PID (ACUTE PELVIC INFLAMMATORY DISEASE): ICD-10-CM

## 2023-01-25 DIAGNOSIS — N70.91 SALPINGITIS: ICD-10-CM

## 2023-01-25 PROCEDURE — 76830 TRANSVAGINAL US NON-OB: CPT | Performed by: OBSTETRICS & GYNECOLOGY

## 2023-01-25 PROCEDURE — 99212 OFFICE O/P EST SF 10 MIN: CPT | Performed by: OBSTETRICS & GYNECOLOGY

## 2023-01-25 NOTE — PROGRESS NOTES
Devante Hinojosa  is a 25 y.o. female, Leon Conklin, Patient's last menstrual period was 01/22/2023 (approximate). ,  who is seen for gyn ultrasound secondary to irregular menses, history of PID and salpingitis, follow up left ovarian cyst.     Having regular period at this time    Ultrasound findings:   Cervix sliver of fluid in canal, isoechoic/hypoechoic mass with blood flow, possible polyp VS blood clot measuring 0.4 x 0.5 x 0.3 cm. Uterus is anteverted and anteflexed, heterogenous, possible diffuse adenomyosis. Endo= 3.3 mm, debris seen moving through canal, patient on cycle now, isoechoic/hyperechoic intracavitary mass visualized with blood flow, possible polyp VS blood clot measuring 0.4 x 0.2 x 0.4 cm  ROV is visualized with multiple follicles around periphery and hemorrhagic CL. LOV is visualized with multiple follicles around periphery and simple exophytic cyst still noted today (Previous 3.1 x 2.1 x 3.7 cm). Bilateral adnexa appear within normal limits, trace amount of free fluid adjacent to LOV. Would like to go over lab results from 01/03/2023. Free testosterone 1.6   FSH 6.3   LH 8.7     HISTORY:  Sexual History:  has sex with males  Contraception:  condoms  No current outpatient medications on file prior to visit. No current facility-administered medications on file prior to visit. ROS:  Review of Pauline Nip  has a past medical history of Acute cystitis without hematuria, Cervicitis and endocervicitis, HSV-1 (herpes simplex virus 1) infection, PID (acute pelvic inflammatory disease), and Trichomonas infection. .    Previous surgeries include  has no past surgical history on file. Tanya Red Her current meds are No current outpatient medications on file. Family history is significant for family history includes Diabetes in her father; High Cholesterol in her father; Hypertension in her father. .       PHYSICAL EXAM:  Blood pressure 124/82, height 5' 5\" (1.651 m), weight 269 lb (122 kg), last menstrual period 01/22/2023, not currently breastfeeding. OBGyn Exam   The patient appears well, alert, oriented x 3, in no distress. .    ASSESSMENT:  Encounter Diagnoses   Name Primary? Irregular menses     Salpingitis     PID (acute pelvic inflammatory disease)     Cyst of ovary, left Yes       PLAN:  Discussed with her that her hormone levels are within normal limits. She is still very concerned about hair growth and rash and irritation on her thighs discussed having her see dermatology. Discussed her ultrasound today appears overall normal.  Her tubes are no longer enlarged. Discussed this is not determine for sure the function of the tubes with that appearance appears more normal.  Overall her ultrasound is normal.  She is interested in trying to ovulatory function by seeing how well she cycles. Discussed possibility of blocked tubes but the fact that her tubes look better I think we can hold off on evaluation further. At this point discussed seeing what happens over the next 3 to 4 months have her track her cycles with the not pregnant. Discussed this point I think since this was normal.  We can observe how she does and have her track her cycles we will see her back in 3 to 4 months based on how her cycles do may need further evaluation or evaluation of her tubes. Discussed possibility of referral to reproductive endocrinology but at this point we will see what happens over the next couple of months  Orders Placed This Encounter   Procedures    AMB POC US, TRANSVAGINAL     Order Specific Question:   Reason for Exam:     Answer:   GYN US     Order Specific Question:   Are you Pregnant? Answer:    No

## 2023-02-23 ENCOUNTER — TELEPHONE (OUTPATIENT)
Dept: OBGYN CLINIC | Age: 23
End: 2023-02-23

## 2023-02-23 NOTE — TELEPHONE ENCOUNTER
Patient called regarding her Dermatology referral. Made an attempt to contact the patient but there was no answer and I wasn't able to leave a voice mail. Referral was placed on 01/25/2023. Referral information.    [facility]   [address] [place] [address], [facility] [address]   [phone]

## 2023-04-05 ENCOUNTER — OFFICE VISIT (OUTPATIENT)
Dept: OBGYN CLINIC | Age: 23
End: 2023-04-05
Payer: COMMERCIAL

## 2023-04-05 VITALS — WEIGHT: 271.2 LBS | BODY MASS INDEX: 45.18 KG/M2 | HEIGHT: 65 IN

## 2023-04-05 DIAGNOSIS — N94.6 DYSMENORRHEA: Primary | ICD-10-CM

## 2023-04-05 DIAGNOSIS — L68.0 HIRSUTISM: ICD-10-CM

## 2023-04-05 DIAGNOSIS — L68.9 EXCESSIVE HAIR GROWTH: ICD-10-CM

## 2023-04-05 DIAGNOSIS — N92.6 IRREGULAR MENSES: ICD-10-CM

## 2023-04-05 DIAGNOSIS — R10.2 PELVIC PAIN IN FEMALE: ICD-10-CM

## 2023-04-05 PROCEDURE — 99214 OFFICE O/P EST MOD 30 MIN: CPT | Performed by: OBSTETRICS & GYNECOLOGY

## 2023-04-05 RX ORDER — CELECOXIB 200 MG/1
200 CAPSULE ORAL 2 TIMES DAILY PRN
Qty: 60 CAPSULE | Refills: 2 | Status: SHIPPED | OUTPATIENT
Start: 2023-04-05

## 2023-04-05 NOTE — Clinical Note
Refer to P[facility] Quality 226: Preventive Care And Screening: Tobacco Use: Screening And Cessation Intervention: Patient screened for tobacco and never smoked

## 2023-04-05 NOTE — PROGRESS NOTES
Anahy Encarnacion  is a 25 y.o. female, Katie Daugherty, Patient's last menstrual period was 03/27/2023 (exact date). ,  who is seen for follow-up on irregular menses. Periods are every month. Lasting 5-6 days. The past two months her period has been a little heavier and painful. December period started on the 22nd. January period started on the 22nd. February period started on the 25th. March period started on 27th. Having some lower abdominal pain. Some days the pain is on the left and other days it's on the right. The pain is described as sharp. The pain started a week before her last period. Sharp lasts seconds   No bowel changes    Taking tylenol and ibuprofen   800mg ibu BID and 1000 tylenol BID    HISTORY:  Sexual History:  has sex with males  Contraception:  condoms  No current outpatient medications on file prior to visit. No current facility-administered medications on file prior to visit. ROS:  Review of Mayela Ortiz  has a past medical history of Acute cystitis without hematuria, Cervicitis and endocervicitis, HSV-1 (herpes simplex virus 1) infection, PID (acute pelvic inflammatory disease), and Trichomonas infection. .    Previous surgeries include  has no past surgical history on file. Arin Alvarez Her current meds are   Current Outpatient Medications:     celecoxib (CELEBREX) 200 MG capsule, Take 1 capsule by mouth 2 times daily as needed for Pain For period pain, Disp: 60 capsule, Rfl: 2     Family history is significant for family history includes Diabetes in her father; High Cholesterol in her father; Hypertension in her father. .       PHYSICAL EXAM:  Height 5' 5\" (1.651 m), weight 271 lb 3.2 oz (123 kg), last menstrual period 03/27/2023, not currently breastfeeding. OBGyn Exam   The patient appears well, alert, oriented x 3, in no distress    ASSESSMENT:  Encounter Diagnoses   Name Primary?     Dysmenorrhea Yes    Pelvic pain in female     Irregular menses        PLAN:  Discussed with her that

## 2023-05-16 ENCOUNTER — APPOINTMENT (OUTPATIENT)
Dept: CT IMAGING | Age: 23
End: 2023-05-16
Payer: COMMERCIAL

## 2023-05-16 ENCOUNTER — HOSPITAL ENCOUNTER (EMERGENCY)
Age: 23
Discharge: HOME | End: 2023-05-16
Attending: EMERGENCY MEDICINE
Payer: COMMERCIAL

## 2023-05-16 VITALS
BODY MASS INDEX: 44.98 KG/M2 | TEMPERATURE: 98.6 F | HEART RATE: 80 BPM | OXYGEN SATURATION: 100 % | DIASTOLIC BLOOD PRESSURE: 92 MMHG | RESPIRATION RATE: 21 BRPM | SYSTOLIC BLOOD PRESSURE: 127 MMHG | WEIGHT: 270 LBS | HEIGHT: 65 IN

## 2023-05-16 DIAGNOSIS — N70.11 HYDROSALPINX: ICD-10-CM

## 2023-05-16 DIAGNOSIS — R10.30 LOWER ABDOMINAL PAIN: Primary | ICD-10-CM

## 2023-05-16 DIAGNOSIS — N30.00 ACUTE CYSTITIS WITHOUT HEMATURIA: ICD-10-CM

## 2023-05-16 DIAGNOSIS — N73.0 PID (ACUTE PELVIC INFLAMMATORY DISEASE): ICD-10-CM

## 2023-05-16 LAB
ALBUMIN SERPL-MCNC: 3.8 G/DL (ref 3.5–5)
ALBUMIN/GLOB SERPL: 1 (ref 0.4–1.6)
ALP SERPL-CCNC: 70 U/L (ref 50–136)
ALT SERPL-CCNC: 20 U/L (ref 12–65)
ANION GAP SERPL CALC-SCNC: 2 MMOL/L (ref 2–11)
APPEARANCE UR: CLEAR
AST SERPL-CCNC: 12 U/L (ref 15–37)
BACTERIA URNS QL MICRO: ABNORMAL /HPF
BASOPHILS # BLD: 0.1 K/UL (ref 0–0.2)
BASOPHILS NFR BLD: 1 % (ref 0–2)
BILIRUB SERPL-MCNC: 0.2 MG/DL (ref 0.2–1.1)
BILIRUB UR QL: NEGATIVE
BUN SERPL-MCNC: 9 MG/DL (ref 6–23)
CALCIUM SERPL-MCNC: 9.2 MG/DL (ref 8.3–10.4)
CASTS URNS QL MICRO: ABNORMAL /LPF
CHLORIDE SERPL-SCNC: 106 MMOL/L (ref 101–110)
CO2 SERPL-SCNC: 27 MMOL/L (ref 21–32)
COLOR UR: ABNORMAL
CREAT SERPL-MCNC: 0.8 MG/DL (ref 0.6–1)
DIFFERENTIAL METHOD BLD: ABNORMAL
EOSINOPHIL # BLD: 0.1 K/UL (ref 0–0.8)
EOSINOPHIL NFR BLD: 1 % (ref 0.5–7.8)
EPI CELLS #/AREA URNS HPF: ABNORMAL /HPF
ERYTHROCYTE [DISTWIDTH] IN BLOOD BY AUTOMATED COUNT: 15.7 % (ref 11.9–14.6)
GLOBULIN SER CALC-MCNC: 3.8 G/DL (ref 2.8–4.5)
GLUCOSE SERPL-MCNC: 93 MG/DL (ref 65–100)
GLUCOSE UR STRIP.AUTO-MCNC: NEGATIVE MG/DL
HCG UR QL: NEGATIVE
HCT VFR BLD AUTO: 38.9 % (ref 35.8–46.3)
HGB BLD-MCNC: 12.3 G/DL (ref 11.7–15.4)
HGB UR QL STRIP: NEGATIVE
IMM GRANULOCYTES # BLD AUTO: 0 K/UL (ref 0–0.5)
IMM GRANULOCYTES NFR BLD AUTO: 0 % (ref 0–5)
KETONES UR QL STRIP.AUTO: NEGATIVE MG/DL
LEUKOCYTE ESTERASE UR QL STRIP.AUTO: ABNORMAL
LIPASE SERPL-CCNC: 92 U/L (ref 73–393)
LYMPHOCYTES # BLD: 3.7 K/UL (ref 0.5–4.6)
LYMPHOCYTES NFR BLD: 44 % (ref 13–44)
MCH RBC QN AUTO: 25.3 PG (ref 26.1–32.9)
MCHC RBC AUTO-ENTMCNC: 31.6 G/DL (ref 31.4–35)
MCV RBC AUTO: 79.9 FL (ref 82–102)
MONOCYTES # BLD: 0.7 K/UL (ref 0.1–1.3)
MONOCYTES NFR BLD: 8 % (ref 4–12)
NEUTS SEG # BLD: 4 K/UL (ref 1.7–8.2)
NEUTS SEG NFR BLD: 46 % (ref 43–78)
NITRITE UR QL STRIP.AUTO: NEGATIVE
NRBC # BLD: 0 K/UL (ref 0–0.2)
PH UR STRIP: 6.5 (ref 5–9)
PLATELET # BLD AUTO: 272 K/UL (ref 150–450)
PMV BLD AUTO: 10.3 FL (ref 9.4–12.3)
POTASSIUM SERPL-SCNC: 3.5 MMOL/L (ref 3.5–5.1)
PROT SERPL-MCNC: 7.6 G/DL (ref 6.3–8.2)
PROT UR STRIP-MCNC: NEGATIVE MG/DL
RBC # BLD AUTO: 4.87 M/UL (ref 4.05–5.2)
RBC #/AREA URNS HPF: ABNORMAL /HPF
SERVICE CMNT-IMP: NORMAL
SODIUM SERPL-SCNC: 135 MMOL/L (ref 133–143)
SP GR UR REFRACTOMETRY: 1.01 (ref 1–1.02)
UROBILINOGEN UR QL STRIP.AUTO: 0.2 EU/DL (ref 0.2–1)
WBC # BLD AUTO: 8.5 K/UL (ref 4.3–11.1)
WBC URNS QL MICRO: ABNORMAL /HPF
WET PREP GENITAL: NORMAL

## 2023-05-16 PROCEDURE — 6360000004 HC RX CONTRAST MEDICATION: Performed by: EMERGENCY MEDICINE

## 2023-05-16 PROCEDURE — 6370000000 HC RX 637 (ALT 250 FOR IP): Performed by: EMERGENCY MEDICINE

## 2023-05-16 PROCEDURE — 81001 URINALYSIS AUTO W/SCOPE: CPT

## 2023-05-16 PROCEDURE — 99285 EMERGENCY DEPT VISIT HI MDM: CPT

## 2023-05-16 PROCEDURE — 96374 THER/PROPH/DIAG INJ IV PUSH: CPT

## 2023-05-16 PROCEDURE — 87591 N.GONORRHOEAE DNA AMP PROB: CPT

## 2023-05-16 PROCEDURE — 74177 CT ABD & PELVIS W/CONTRAST: CPT

## 2023-05-16 PROCEDURE — 87210 SMEAR WET MOUNT SALINE/INK: CPT

## 2023-05-16 PROCEDURE — 96372 THER/PROPH/DIAG INJ SC/IM: CPT

## 2023-05-16 PROCEDURE — 6360000002 HC RX W HCPCS: Performed by: EMERGENCY MEDICINE

## 2023-05-16 PROCEDURE — 83690 ASSAY OF LIPASE: CPT

## 2023-05-16 PROCEDURE — 85025 COMPLETE CBC W/AUTO DIFF WBC: CPT

## 2023-05-16 PROCEDURE — 2500000003 HC RX 250 WO HCPCS: Performed by: EMERGENCY MEDICINE

## 2023-05-16 PROCEDURE — 81025 URINE PREGNANCY TEST: CPT

## 2023-05-16 PROCEDURE — 87491 CHLMYD TRACH DNA AMP PROBE: CPT

## 2023-05-16 PROCEDURE — 80053 COMPREHEN METABOLIC PANEL: CPT

## 2023-05-16 RX ORDER — DOXYCYCLINE HYCLATE 100 MG
100 TABLET ORAL 2 TIMES DAILY
Qty: 14 TABLET | Refills: 0 | Status: SHIPPED | OUTPATIENT
Start: 2023-05-16 | End: 2023-05-23

## 2023-05-16 RX ORDER — SODIUM CHLORIDE 0.9 % (FLUSH) 0.9 %
10 SYRINGE (ML) INJECTION
Status: DISCONTINUED | OUTPATIENT
Start: 2023-05-16 | End: 2023-05-17 | Stop reason: HOSPADM

## 2023-05-16 RX ORDER — DOXYCYCLINE HYCLATE 100 MG/1
100 CAPSULE ORAL
Status: COMPLETED | OUTPATIENT
Start: 2023-05-16 | End: 2023-05-16

## 2023-05-16 RX ORDER — 0.9 % SODIUM CHLORIDE 0.9 %
100 INTRAVENOUS SOLUTION INTRAVENOUS
Status: DISCONTINUED | OUTPATIENT
Start: 2023-05-16 | End: 2023-05-17 | Stop reason: HOSPADM

## 2023-05-16 RX ORDER — ONDANSETRON 8 MG/1
8 TABLET, ORALLY DISINTEGRATING ORAL EVERY 8 HOURS PRN
Qty: 12 TABLET | Refills: 1 | Status: SHIPPED | OUTPATIENT
Start: 2023-05-16

## 2023-05-16 RX ORDER — KETOROLAC TROMETHAMINE 30 MG/ML
30 INJECTION, SOLUTION INTRAMUSCULAR; INTRAVENOUS
Status: COMPLETED | OUTPATIENT
Start: 2023-05-16 | End: 2023-05-16

## 2023-05-16 RX ORDER — PHENAZOPYRIDINE HYDROCHLORIDE 100 MG/1
200 TABLET, FILM COATED ORAL 3 TIMES DAILY PRN
Qty: 6 TABLET | Refills: 0 | Status: SHIPPED | OUTPATIENT
Start: 2023-05-16 | End: 2023-05-19

## 2023-05-16 RX ORDER — CEFTRIAXONE 500 MG/1
500 INJECTION, POWDER, FOR SOLUTION INTRAMUSCULAR; INTRAVENOUS
Status: DISCONTINUED | OUTPATIENT
Start: 2023-05-16 | End: 2023-05-16 | Stop reason: SDUPTHER

## 2023-05-16 RX ORDER — HYDROCODONE BITARTRATE AND ACETAMINOPHEN 10; 325 MG/1; MG/1
.5-1 TABLET ORAL EVERY 6 HOURS PRN
Qty: 12 TABLET | Refills: 0 | Status: SHIPPED | OUTPATIENT
Start: 2023-05-16 | End: 2023-05-19

## 2023-05-16 RX ADMIN — DOXYCYCLINE HYCLATE 100 MG: 100 CAPSULE ORAL at 22:34

## 2023-05-16 RX ADMIN — HYOSCYAMINE SULFATE 250 MCG: 0.12 TABLET ORAL; SUBLINGUAL at 20:36

## 2023-05-16 RX ADMIN — IOPAMIDOL 100 ML: 755 INJECTION, SOLUTION INTRAVENOUS at 21:17

## 2023-05-16 RX ADMIN — KETOROLAC TROMETHAMINE 30 MG: 30 INJECTION, SOLUTION INTRAMUSCULAR at 20:37

## 2023-05-16 RX ADMIN — LIDOCAINE HYDROCHLORIDE 500 MG: 10 INJECTION, SOLUTION INFILTRATION; PERINEURAL at 22:35

## 2023-05-16 ASSESSMENT — LIFESTYLE VARIABLES
HOW MANY STANDARD DRINKS CONTAINING ALCOHOL DO YOU HAVE ON A TYPICAL DAY: 1 OR 2
HOW OFTEN DO YOU HAVE A DRINK CONTAINING ALCOHOL: MONTHLY OR LESS

## 2023-05-16 ASSESSMENT — PAIN DESCRIPTION - DESCRIPTORS: DESCRIPTORS: ACHING;DISCOMFORT;SHARP

## 2023-05-16 ASSESSMENT — PAIN SCALES - GENERAL
PAINLEVEL_OUTOF10: 3
PAINLEVEL_OUTOF10: 10
PAINLEVEL_OUTOF10: 8

## 2023-05-16 ASSESSMENT — PAIN DESCRIPTION - LOCATION: LOCATION: ABDOMEN

## 2023-05-16 ASSESSMENT — PAIN DESCRIPTION - FREQUENCY: FREQUENCY: CONTINUOUS

## 2023-05-16 ASSESSMENT — PAIN - FUNCTIONAL ASSESSMENT: PAIN_FUNCTIONAL_ASSESSMENT: 0-10

## 2023-05-16 ASSESSMENT — PAIN DESCRIPTION - PAIN TYPE: TYPE: ACUTE PAIN

## 2023-05-16 NOTE — ED TRIAGE NOTES
Pt c/o sharp abd pain x3days pain is epigastric and BLQ (-)fever, emesis, diarrhea (+)nausea  Negative home preg test today, LMP 4/27/23  A&OX4

## 2023-05-17 ASSESSMENT — ENCOUNTER SYMPTOMS
VOMITING: 0
COLOR CHANGE: 0
NAUSEA: 0
SHORTNESS OF BREATH: 0
EYE REDNESS: 0
RHINORRHEA: 0
FACIAL SWELLING: 0
ABDOMINAL PAIN: 1
COUGH: 0
BACK PAIN: 0
EYE DISCHARGE: 0

## 2023-05-17 NOTE — ED PROVIDER NOTES
Emergency Department Provider Note       PCP: On File Not (Inactive)   Age: 25 y.o. Sex: female     DISPOSITION Decision To Discharge 05/16/2023 10:29:54 PM       ICD-10-CM    1. Lower abdominal pain  R10.30 HYDROcodone-acetaminophen (LORCET HD)  MG per tablet      2. Hydrosalpinx  N70.11 HYDROcodone-acetaminophen (LORCET HD)  MG per tablet      3. Acute cystitis without hematuria  N30.00       4. PID (acute pelvic inflammatory disease)  N73.0           Medical Decision Making     Complexity of Problems Addressed:  1 or more acute illnesses that pose a threat to life or bodily function. Data Reviewed and Analyzed:  Category 1:   I independently ordered and reviewed each unique test.  I reviewed external records: provider visit note from PCP. I reviewed external records: previous imaging study including radiologist interpretation. Category 2:   I interpreted the CT Scan CT scan showing hydrosalpinx similar to recent ultrasound no obvious abscess. I interpreted the blood work from the laboratory which is reassuring, gonorrhea chlamydia probes pending. Category 3: Discussion of management or test interpretation. 3 days of lower abdominal pain most in keeping with PID, patient additionally has UTI, should be covered well with doxycycline for both infections    Risk of Complications and/or Morbidity of Patient Management:  Prescription drug management performed. Patient was discharged risks and benefits of hospitalization were considered. Shared medical decision making was utilized in creating the patients health plan today. Considerations: The following items were considered but not ordered: Ultrasound, however she will probably get this done through her gynecologist office more expediently and cheaply.          History      David Alexander is a 25 y.o. female who presents to the Emergency Department with chief complaint of    Chief Complaint   Patient presents with    Abdominal Pain

## 2023-05-17 NOTE — ED NOTES
I have reviewed discharge instructions with the patient. The patient verbalized understanding. Patient left ED via Discharge Method: ambulatory to Home with (insert name of family/friend, self, transport self). Opportunity for questions and clarification provided. Patient given 4 scripts. To continue your aftercare when you leave the hospital, you may receive an automated call from our care team to check in on how you are doing. This is a free service and part of our promise to provide the best care and service to meet your aftercare needs. \" If you have questions, or wish to unsubscribe from this service please call [phone]. Thank you for Choosing our [facility] Emergency Department.         Kimberley Miller RN  05/16/23 [id number]

## 2023-05-18 LAB
C TRACH RRNA SPEC QL NAA+PROBE: NEGATIVE
N GONORRHOEA RRNA SPEC QL NAA+PROBE: NEGATIVE
SPECIMEN SOURCE: NORMAL

## 2023-08-09 ENCOUNTER — HOSPITAL ENCOUNTER (EMERGENCY)
Age: 23
Discharge: HOME | End: 2023-08-09
Attending: EMERGENCY MEDICINE
Payer: COMMERCIAL

## 2023-08-09 VITALS
DIASTOLIC BLOOD PRESSURE: 75 MMHG | WEIGHT: 240 LBS | RESPIRATION RATE: 18 BRPM | OXYGEN SATURATION: 99 % | BODY MASS INDEX: 39.99 KG/M2 | TEMPERATURE: 98.1 F | HEIGHT: 65 IN | SYSTOLIC BLOOD PRESSURE: 90 MMHG | HEART RATE: 94 BPM

## 2023-08-09 DIAGNOSIS — H10.11 ALLERGIC CONJUNCTIVITIS OF RIGHT EYE: Primary | ICD-10-CM

## 2023-08-09 PROCEDURE — 6370000000 HC RX 637 (ALT 250 FOR IP)

## 2023-08-09 PROCEDURE — 99283 EMERGENCY DEPT VISIT LOW MDM: CPT

## 2023-08-09 RX ORDER — ERYTHROMYCIN 5 MG/G
OINTMENT OPHTHALMIC
Qty: 3.5 G | Refills: 0 | Status: SHIPPED | OUTPATIENT
Start: 2023-08-09

## 2023-08-09 RX ORDER — TETRACAINE HYDROCHLORIDE 5 MG/ML
1 SOLUTION OPHTHALMIC
Status: COMPLETED | OUTPATIENT
Start: 2023-08-09 | End: 2023-08-09

## 2023-08-09 RX ADMIN — FLUORESCEIN SODIUM 1 MG: 1 STRIP OPHTHALMIC at 09:20

## 2023-08-09 RX ADMIN — TETRACAINE HYDROCHLORIDE 1 DROP: 5 SOLUTION OPHTHALMIC at 09:20

## 2023-08-09 ASSESSMENT — ENCOUNTER SYMPTOMS
VOMITING: 0
EYE DISCHARGE: 1
CHEST TIGHTNESS: 0
NAUSEA: 0
SHORTNESS OF BREATH: 0
COLOR CHANGE: 0
DIARRHEA: 0
ABDOMINAL PAIN: 0
EYE PAIN: 1

## 2023-08-09 ASSESSMENT — VISUAL ACUITY: OU: 20/20

## 2023-08-09 ASSESSMENT — PAIN DESCRIPTION - LOCATION: LOCATION: EYE

## 2023-08-09 ASSESSMENT — LIFESTYLE VARIABLES
HOW OFTEN DO YOU HAVE A DRINK CONTAINING ALCOHOL: MONTHLY OR LESS
HOW MANY STANDARD DRINKS CONTAINING ALCOHOL DO YOU HAVE ON A TYPICAL DAY: 7 TO 9

## 2023-08-09 ASSESSMENT — PAIN - FUNCTIONAL ASSESSMENT: PAIN_FUNCTIONAL_ASSESSMENT: 0-10

## 2023-08-09 ASSESSMENT — PAIN SCALES - GENERAL: PAINLEVEL_OUTOF10: 7

## 2023-08-09 ASSESSMENT — PAIN DESCRIPTION - ORIENTATION: ORIENTATION: RIGHT

## 2023-08-09 NOTE — ED TRIAGE NOTES
Pt ambulatory to triage with CO pain and swelling to right eye. Reports watering.  Denies injury, states \"im supposed to wear glasses so sometimes it swells\"

## 2023-08-09 NOTE — DISCHARGE INSTRUCTIONS
Your symptoms appear most attributable to an allergic conjunctivitis (pinkeye) as there is no evidence of corneal scratches or object stuck in the eye. Try taking an over-the-counter antihistamine such as Allegra, Claritin, Zyrtec, etc.  You may also  some artificial tears to use to keep the eye soothed and moist.    I have provided a prescription for antibiotic eye ointment. Try using the other methods first.  If you notice no improvement in 3 to 5 days, go ahead and fill this. Please return with any symptoms such as worsening visual changes or worsening pain despite medication.

## 2023-08-09 NOTE — ED PROVIDER NOTES
Emergency Department Provider Note       PCP: On File Not (Inactive)   Age: 25 y.o. Sex: female     DISPOSITION Decision To Discharge 08/09/2023 09:37:27 AM       ICD-10-CM    1. Allergic conjunctivitis of right eye  H10.11           Medical Decision Making     Complexity of Problems Addressed:  1 minor problem    Data Reviewed and Analyzed:  I independently ordered and reviewed each unique test.             Discussion of management or test interpretation. 60-year-old female presenting with right eye irritation and redness that started this morning. No ocular trauma or foreign bodies. Some visual blurriness. Patient has worn prescriptive lenses in the past but has not done so for some time. Patient vitally stable. She has slight erythema of the right conjunctiva with watery drainage. No fluorescein uptake or foreign bodies noted. Vision is not significantly diminished unilaterally and is consistent with baseline. Exam appears consistent with allergic vs viral conjunctivitis. I advised trial of over-the-counter antihistamines and ocular hygiene. I have very low suspicion of bacterial component, however, I will provide a wait-and-see prescription for erythromycin ointment should her symptoms worsen or fail to improve. Patient stable for discharge. Return precautions discussed. Patient understanding and agreeable to plan. Risk of Complications and/or Morbidity of Patient Management:  Prescription drug management performed. History      Julio Marquez is a 25 y.o. female who presents to the Emergency Department with chief complaint of    Chief Complaint   Patient presents with    Eye Pain      Patient is a 60-year-old female with no significant past medical history presenting to the ED for evaluation of right eye irritation, swelling and drainage. Patient states that she began experiencing symptoms this morning. She endorses some blurriness in the right eye as well.   Patient states

## 2023-12-02 ENCOUNTER — APPOINTMENT (OUTPATIENT)
Dept: GENERAL RADIOLOGY | Age: 23
End: 2023-12-02
Payer: COMMERCIAL

## 2023-12-02 ENCOUNTER — HOSPITAL ENCOUNTER (EMERGENCY)
Age: 23
Discharge: HOME | End: 2023-12-02
Payer: COMMERCIAL

## 2023-12-02 VITALS
BODY MASS INDEX: 45 KG/M2 | RESPIRATION RATE: 18 BRPM | HEIGHT: 66 IN | OXYGEN SATURATION: 98 % | HEART RATE: 86 BPM | TEMPERATURE: 98.8 F | WEIGHT: 280 LBS | SYSTOLIC BLOOD PRESSURE: 142 MMHG | DIASTOLIC BLOOD PRESSURE: 65 MMHG

## 2023-12-02 DIAGNOSIS — R07.89 CHEST WALL PAIN: Primary | ICD-10-CM

## 2023-12-02 PROCEDURE — 71045 X-RAY EXAM CHEST 1 VIEW: CPT

## 2023-12-02 PROCEDURE — 99283 EMERGENCY DEPT VISIT LOW MDM: CPT

## 2023-12-02 PROCEDURE — 6370000000 HC RX 637 (ALT 250 FOR IP)

## 2023-12-02 RX ORDER — LIDOCAINE 4 G/G
1 PATCH TOPICAL DAILY
Qty: 30 PATCH | Refills: 0 | Status: SHIPPED | OUTPATIENT
Start: 2023-12-02 | End: 2024-01-01

## 2023-12-02 RX ORDER — ACETAMINOPHEN 500 MG
1000 TABLET ORAL
Status: COMPLETED | OUTPATIENT
Start: 2023-12-02 | End: 2023-12-02

## 2023-12-02 RX ORDER — LIDOCAINE 4 G/G
1 PATCH TOPICAL
Status: DISCONTINUED | OUTPATIENT
Start: 2023-12-02 | End: 2023-12-02 | Stop reason: HOSPADM

## 2023-12-02 RX ADMIN — ACETAMINOPHEN 1000 MG: 500 TABLET, FILM COATED ORAL at 14:26

## 2023-12-02 ASSESSMENT — PAIN SCALES - GENERAL: PAINLEVEL_OUTOF10: 10

## 2023-12-02 ASSESSMENT — PAIN - FUNCTIONAL ASSESSMENT: PAIN_FUNCTIONAL_ASSESSMENT: 0-10

## 2023-12-02 NOTE — ED PROVIDER NOTES
Emergency Department Provider Note       PCP: Not, On File (Inactive)   Age: 25 y.o. Sex: female     DISPOSITION Decision To Discharge 12/02/2023 03:43:40 PM       ICD-10-CM    1. Chest wall pain  R07.89           Medical Decision Making     Complexity of Problems Addressed:  1 acute problem    Data Reviewed and Analyzed:   I independently ordered and reviewed each unique test.               I interpreted the X-rays no acute. Discussion of management or test interpretation. Presented with concerns of reproducible chest wall pain that is exacerbated with positional changes. On examination she has diffuse right anterior chest wall tenderness to palpation. Chest x-ray imaging negative for acute fracture. No evidence of pneumothorax. Given lidocaine pain patch and Tylenol in ED course. Advise symptomatic management with NSAIDs versus Tylenol in addition to lidocaine pain patches for symptomatic relief. I do not suspect an acute cardiopulmonary abnormality given the diffuse reproducible chest wall tenderness on examination. Suspicion of musculoskeletal etiology and advised outpatient symptomatic management. Discussed return precautions and patient discharged in stable condition. Risk of Complications and/or Morbidity of Patient Management:  Prescription drug management performed. Patient was discharged risks and benefits of hospitalization were considered. Shared medical decision making was utilized in creating the patients health plan today. Is this patient to be included in the SEP-1 core measure due to severe sepsis or septic shock? No Exclusion criteria - the patient is NOT to be included for SEP-1 Core Measure due to: Infection is not suspected      History      20-year-old female presents emergency department chief complaint of cough and cold and congestion like symptoms. Was seen at [facility] emergency department 2 days ago and had negative COVID-19, influenza, and RSV test done. pelvic inflammatory disease) 06/21/2022    Trichomonas infection 11/27/2021        History reviewed. No pertinent surgical history. Social History     Socioeconomic History    Marital status: Single     Spouse name: None    Number of children: None    Years of education: None    Highest education level: None   Tobacco Use    Smoking status: Every Day    Smokeless tobacco: Never   Substance and Sexual Activity    Alcohol use: Yes    Drug use: Yes     Types: Marijuana Ema Khan)     Comment: Black and Mild    Sexual activity: Yes     Partners: Male     Birth control/protection: Condom        Previous Medications    CELECOXIB (CELEBREX) 200 MG CAPSULE    Take 1 capsule by mouth 2 times daily as needed for Pain For period pain    ERYTHROMYCIN (ROMYCIN) 5 MG/GM OPHTHALMIC OINTMENT    Apply pea-sized ribbon to right eye up 4 times daily for 7 days. ONDANSETRON (ZOFRAN-ODT) 8 MG TBDP DISINTEGRATING TABLET    Place 1 tablet under the tongue every 8 hours as needed for Nausea or Vomiting        Results for orders placed or performed during the hospital encounter of 12/02/23   XR CHEST 1 VIEW    Narrative    CHEST X-RAY, single portable view  12/2/2023    History: Reproducible right anterior upper chest pain. Technique: Single frontal view of the chest.    Comparison: None    Findings:   Today's study is limited by patient body habitus and portable technique. The  cardiac silhouette is mildly enlarged. The lungs are expanded without evidence  for pneumothorax. No consolidative airspace process or pleural effusion is  seen. Impression    1. Mild cardiomegaly of uncertain acuity. No acute process otherwise suggested. This report was made using voice transcription.  Despite my best efforts to avoid  any, transcription errors may persist. If there is any question about the  accuracy of the report or need for clarification, then please call [phone], or text me through [employer] for clarification or

## 2023-12-02 NOTE — DISCHARGE INSTRUCTIONS
Take ibuprofen or Tylenol as needed for your pain symptoms. Your chest x-ray looks good. Continue using lidocaine pain patches as needed for symptom relief. Return if you have any new or worsening symptoms.

## 2023-12-02 NOTE — ED TRIAGE NOTES
Pt reports cough, yellow sputum, sore throat, body pain x 3 days. Reports smokes cigars and THC. EMS reports temp 100F. Pt with pain with coughing and palpation of chest wall.